# Patient Record
Sex: MALE | Race: WHITE
[De-identification: names, ages, dates, MRNs, and addresses within clinical notes are randomized per-mention and may not be internally consistent; named-entity substitution may affect disease eponyms.]

---

## 2019-04-11 ENCOUNTER — HOSPITAL ENCOUNTER (EMERGENCY)
Dept: HOSPITAL 10 - FTE | Age: 54
Discharge: HOME | End: 2019-04-11
Payer: SELF-PAY

## 2019-04-11 ENCOUNTER — HOSPITAL ENCOUNTER (EMERGENCY)
Dept: HOSPITAL 91 - FTE | Age: 54
Discharge: HOME | End: 2019-04-11
Payer: SELF-PAY

## 2019-04-11 VITALS — SYSTOLIC BLOOD PRESSURE: 119 MMHG | DIASTOLIC BLOOD PRESSURE: 74 MMHG | HEART RATE: 105 BPM | RESPIRATION RATE: 20 BRPM

## 2019-04-11 VITALS
BODY MASS INDEX: 25.53 KG/M2 | WEIGHT: 172.4 LBS | HEIGHT: 69 IN | WEIGHT: 172.4 LBS | HEIGHT: 69 IN | BODY MASS INDEX: 25.53 KG/M2

## 2019-04-11 DIAGNOSIS — L02.414: Primary | ICD-10-CM

## 2019-04-11 DIAGNOSIS — Z87.891: ICD-10-CM

## 2019-04-11 DIAGNOSIS — F19.90: ICD-10-CM

## 2019-04-11 PROCEDURE — 99283 EMERGENCY DEPT VISIT LOW MDM: CPT

## 2019-04-11 RX ADMIN — CEPHALEXIN 1 MG: 500 CAPSULE ORAL at 08:06

## 2019-04-11 RX ADMIN — SULFAMETHOXAZOLE AND TRIMETHOPRIM 1 TAB: 800; 160 TABLET ORAL at 08:06

## 2019-04-11 RX ADMIN — IBUPROFEN 1 MG: 800 TABLET, FILM COATED ORAL at 08:06

## 2019-04-11 NOTE — ERD
ER Documentation


Chief Complaint


Chief Complaint





right wrist abscess/redness/swelling x 2 days. states from iv drug use





HPI


53-year-old male presents for an abscess to the right arm.  Patient admits to 


IVDU.  He states he has had some reoccurring abscesses for the past "couple 


months ".  He states that the abscesses occur over injection sites.  Patient 


states the abscess has been draining over the past couple days and he is 


experiencing pain and redness over the site.  He denies any fevers, chills, 


nausea, vomiting, chest pain, shortness of breath, cough or other systemic 


symptoms.  





Patient denies any pertinent past medical history including hypertension, 


diabetes, or HIV infection.  He states he has previously had 2 surgeries to the 


right hand.  Not currently taking any medications and has no allergies to 


medications.





He admits to occasional alcohol use, however, he denies drug use other than 


intravenous methamphetamines.





ROS


All systems reviewed and are negative except as per history of present illness.





Medications


Home Meds


Discontinued Scripts


Sulfamethoxazole/Trimethoprim* (Bactrim Ds* Tablet) 1 Each Tablet, 1 TAB PO BID 


for 10 Days, TAB


   Prov:TOBY PRICE PA-C         4/11/19


Cephalexin* (Keflex*) 500 Mg Capsule, 500 MG PO QID for 10 Days, CAP


   Prov:TOBY PRICE PA-C         4/11/19





Allergies


Allergies:  


Coded Allergies:  


     No Known Drug Allergies (Verified  Allergy, Unknown, 4/18/19)





PMhx/Soc


Medical and Surgical Hx:  pt denies Medical Hx, pt denies Surgical Hx


Hx Alcohol Use:  Yes


Hx Substance Use:  Yes (iv drug user, crystal meth)


Hx Tobacco Use:  Yes


Smoking Status:  Former smoker





FmHx


Family History:  No diabetes, No coronary disease





Physical Exam


Vitals


Vital Signs


  Date      Temp  Pulse  Resp  B/P (MAP)   Pulse Ox  O2          O2 Flow    FiO2


Time                                                 Delivery    Rate


    4/9/19  97.7     83    20      112/63        97


     15:02                           (79)





Physical Exam


Const:   Moderate distress


Head:   Atraumatic 


Eyes:    Normal Conjunctiva


ENT:    Normal External Ears, Nose and Mouth.


Neck:               Full range of motion. No meningismus.


Resp:   Clear to auscultation bilaterally


Cardio:   Regular rate and rhythm, no murmurs


Abd:    Soft, non tender, non distended. Normal bowel sounds


Skin:   Multiple areas of scar tissue over the left forearm.  Approximately 


2.5cm area of poorly healing granulated tissue over the left forearm, however 


nontender and without purulence.  three centimeter area over the volar aspect of


the right forearm currently draining pus.  Noticeable erythema and soft tissue 


swelling over the right volar aspect of the forearm with tenderness to palpation


Back:   No midline or flank tenderness


Ext:    No cyanosis, or edema


Neur:   Awake and alert


Psych:    Normal Mood and Affect


Results 24 hrs





Current Medications


 Medications
   Dose
          Sig/Brian
       Start Time
   Status  Last


 (Trade)       Ordered        Route
 PRN     Stop Time              Admin
Dose


                              Reason                                Admin


                1 tab          ONCE  ONCE
    4/11/19       DC           4/11/19


Trimethoprim/                 PO
            08:00
                       08:06




                                            4/11/19 08:01


Sulfamethoxaz


ole



(Bactrim


(Ds))


 Cephalexin
    500 mg         ONCE  ONCE
    4/11/19       DC           4/11/19


(Keflex)                      PO
            08:00
                       08:06



                                             4/11/19 08:01


 Ibuprofen
     800 mg         ONCE  ONCE
    4/11/19       DC           4/11/19


(Motrin)                      PO
            08:00
                       08:06



                                             4/11/19 08:01








Procedures/MDM


53-year-old male patient with a past medical history of IV drug use presents to 


the ED complaining of an abscess on the forearm that started 2 days ago.  


Patient reports that he used it, 4 days ago.  Patient was given Bactrim, Keflex 


here in the ED.  Given ibuprofen here in the ED with improvement of his pain.





Spontaneous copious purulent discharge continuously drained from the abscess.  


Patient just was discussed with my supervising physician, Dr. Norwood who 


agreed with the outpatient management.  This likely secondary to IV drug use, 


meth. Strict instructions for wound check in 2 days. Low suspicion for an


aphylaxis, scabies, SJS/TEN, TSS, Lyme's Disease, syphilis, RMSF, shingles, 


disseminated gonorrhea chlamydia, DIC, TTP, ITP, erythema multiforme, sepsis, 


cellulitis, necrotizing fasciitis, gangrene, meningococcemia, allergic contact 


dermatitis, urticaria, eczema, tinea infection, or other emergent conditions.











Keflex and Bactrim was prescribed to patient.  Instructed patient to return to 


the ED sooner for any worsening symptoms.  Follow up with primary care physician


or return to the ED in 2 days for a wound check.  Patient's questions were 


answered.  Patient understood and agreed with discharge plan.





Departure


Diagnosis:  


   Primary Impression:  


   Abscess


   Additional Impression:  


   IV drug user


Condition:  Stable


Patient Instructions:  Understanding Methamphetamine Abuse and Addiction, 


Abscess, Antiobiotic Treatment Only


Referrals:  


[facility]


YOU HAVE RECEIVED A MEDICAL SCREENING EXAM AND THE RESULTS INDICATE THAT YOU DO 


NOT HAVE A CONDITION THAT REQUIRES URGENT TREATMENT IN THE EMERGENCY DEPARTMENT.





FURTHER EVALUATION AND TREATMENT OF YOUR CONDITION CAN WAIT UNTIL YOU ARE SEEN 


IN YOUR DOCTORS OFFICE WITHIN THE NEXT 1-2 DAYS. IT IS YOUR RESPONSIBILITY TO 


MAKE AN APPOINTMENT FOR FOLOW-UP CARE.





IF YOU HAVE A PRIMARY DOCTOR


--you should call your primary doctor and schedule an appointment





IF YOU DO NOT HAVE A PRIMARY DOCTOR YOU CAN CALL OUR PHYSICIAN REFERRAL HOTLINE 


AT


 [phone] 





IF YOU CAN NOT AFFORD TO SEE A PHYSICIAN YOU CAN CHOSE FROM THE FOLLOWING 


[facility] [phone] [address] [facility]. [facility] [phone] [address] [facility]. [facility] [phone] [address] [facility] [phone] [facility] [phone] [address] [facility]. [facility] [phone] [address] [facility]. [facility]


YOU HAVE RECEIVED A MEDICAL SCREENING EXAM AND THE RESULTS INDICATE THAT YOU DO 


NOT HAVE A CONDITION THAT REQUIRES URGENT TREATMENT IN THE EMERGENCY DEPARTMENT.





FURTHER EVALUATION AND TREATMENT OF YOUR CONDITION CAN WAIT UNTIL YOU ARE SEEN 


IN YOUR DOCTORS OFFICE WITHIN THE NEXT 1-2 DAYS. IT IS YOUR RESPONSIBILITY TO 


MAKE AN APPOINTMENT FOR FOLOW-UP CARE.





IF YOU HAVE A PRIMARY DOCTOR


--you should call your primary doctor and schedule and appointment





IF YOU DO NOT HAVE A PRIMARY DOCTOR YOU CAN CALL OUR PHYSICIAN REFERRAL HOTLINE 


AT [phone].





IF YOU CAN NOT AFFORD TO SEE A PHYSICIAN YOU CAN CHOSE FROM THE FOLLOWING [facility]


INSTITUTIONS:





[facility]


[address] [place] [address]





[facility]


[address] [place] [address]





[facility]


[address] [place] [address]





[facility] URGENT [facility]ARE[facility]SPECIALT[facility]








[facility]





Additional Instructions:  


Call your primary care doctor TOMORROW for an appointment during the next 2-3 


days.See the doctor sooner or return here if your condition worsens before your 


appointment time.











TOBY PRICE PA-C              Apr 11, 2019 08:15

## 2019-04-18 ENCOUNTER — HOSPITAL ENCOUNTER (INPATIENT)
Dept: HOSPITAL 91 - 6WM | Age: 54
LOS: 2 days | Discharge: HOME | DRG: 871 | End: 2019-04-20
Payer: MEDICAID

## 2019-04-18 ENCOUNTER — HOSPITAL ENCOUNTER (INPATIENT)
Dept: HOSPITAL 10 - E/R | Age: 54
LOS: 2 days | Discharge: HOME | DRG: 871 | End: 2019-04-20
Attending: INTERNAL MEDICINE | Admitting: INTERNAL MEDICINE
Payer: MEDICAID

## 2019-04-18 VITALS — HEART RATE: 110 BPM

## 2019-04-18 VITALS
BODY MASS INDEX: 26.89 KG/M2 | HEIGHT: 67 IN | BODY MASS INDEX: 26.89 KG/M2 | HEIGHT: 67 IN | WEIGHT: 171.3 LBS | WEIGHT: 171.3 LBS

## 2019-04-18 VITALS — HEART RATE: 110 BPM | DIASTOLIC BLOOD PRESSURE: 72 MMHG | SYSTOLIC BLOOD PRESSURE: 101 MMHG | RESPIRATION RATE: 18 BRPM

## 2019-04-18 DIAGNOSIS — Z72.0: ICD-10-CM

## 2019-04-18 DIAGNOSIS — R06.6: ICD-10-CM

## 2019-04-18 DIAGNOSIS — A41.9: Primary | ICD-10-CM

## 2019-04-18 DIAGNOSIS — E87.1: ICD-10-CM

## 2019-04-18 DIAGNOSIS — Z72.89: ICD-10-CM

## 2019-04-18 DIAGNOSIS — F15.10: ICD-10-CM

## 2019-04-18 DIAGNOSIS — R21: ICD-10-CM

## 2019-04-18 DIAGNOSIS — N17.9: ICD-10-CM

## 2019-04-18 DIAGNOSIS — R65.21: ICD-10-CM

## 2019-04-18 DIAGNOSIS — K21.9: ICD-10-CM

## 2019-04-18 LAB
ADD MAN DIFF?: NO
ADD UMIC: NO
ALANINE AMINOTRANSFERASE: 20 IU/L (ref 13–69)
ALBUMIN/GLOBULIN RATIO: 1.02
ALBUMIN: 3.7 G/DL (ref 3.3–4.9)
ALKALINE PHOSPHATASE: 120 IU/L (ref 42–121)
AMPHETAMINE/METHAMPHETAMINE: POSITIVE
ANION GAP: 18 (ref 5–13)
ASPARTATE AMINO TRANSFERASE: 57 IU/L (ref 15–46)
BASOPHIL #: 0.1 10^3/UL (ref 0–0.1)
BASOPHILS %: 0.6 % (ref 0–2)
BILIRUBIN,DIRECT: 0 MG/DL (ref 0–0.2)
BILIRUBIN,TOTAL: 1.1 MG/DL (ref 0.2–1.3)
BLOOD UREA NITROGEN: 22 MG/DL (ref 7–20)
CALCIUM: 10.4 MG/DL (ref 8.4–10.2)
CARBON DIOXIDE: 13 MMOL/L (ref 21–31)
CHLORIDE: 100 MMOL/L (ref 97–110)
CREATININE: 2.13 MG/DL (ref 0.61–1.24)
EOSINOPHILS #: 0 10^3/UL (ref 0–0.5)
EOSINOPHILS %: 0 % (ref 0–7)
GLOBULIN: 3.6 G/DL (ref 1.3–3.2)
GLUCOSE: 268 MG/DL (ref 70–220)
HEMATOCRIT: 56.3 % (ref 42–52)
HEMOGLOBIN A1C: 6 % (ref 0–5.9)
HEMOGLOBIN: 19.1 G/DL (ref 14–18)
IMMATURE GRANS #M: 0.17 10^3/UL (ref 0–0.03)
IMMATURE GRANS % (M): 0.8 % (ref 0–0.43)
INR: 0.91
LACTIC ACID: 2.3 MMOL/L (ref 0.5–2)
LYMPHOCYTES #: 3.4 10^3/UL (ref 0.8–2.9)
LYMPHOCYTES %: 15.8 % (ref 15–51)
MEAN CORPUSCULAR HEMOGLOBIN: 29.9 PG (ref 29–33)
MEAN CORPUSCULAR HGB CONC: 33.9 G/DL (ref 32–37)
MEAN CORPUSCULAR VOLUME: 88.1 FL (ref 82–101)
MEAN PLATELET VOLUME: 10.5 FL (ref 7.4–10.4)
MONOCYTE #: 0.8 10^3/UL (ref 0.3–0.9)
MONOCYTES %: 3.8 % (ref 0–11)
NEUTROPHIL #: 16.8 10^3/UL (ref 1.6–7.5)
NEUTROPHILS %: 79 % (ref 39–77)
NUCLEATED RED BLOOD CELLS #: 0 10^3/UL (ref 0–0)
NUCLEATED RED BLOOD CELLS%: 0 /100WBC (ref 0–0)
PARTIAL THROMBOPLASTIN TIME: 27 SEC (ref 23–35)
PLATELET COUNT: 507 10^3/UL (ref 140–415)
POTASSIUM: 5.2 MMOL/L (ref 3.5–5.1)
PROTIME: 12.4 SEC (ref 11.9–14.9)
PT RATIO: 1
RED BLOOD COUNT: 6.39 10^6/UL (ref 4.7–6.1)
RED CELL DISTRIBUTION WIDTH: 12.5 % (ref 11.5–14.5)
SODIUM: 131 MMOL/L (ref 135–144)
TOTAL PROTEIN: 7.3 G/DL (ref 6.1–8.1)
TROPONIN-I: < 0.012 NG/ML (ref 0–0.12)
UR ASCORBIC ACID: NEGATIVE MG/DL
UR BILIRUBIN (DIP): NEGATIVE MG/DL
UR BLOOD (DIP): NEGATIVE MG/DL
UR CLARITY: (no result)
UR COLOR: YELLOW
UR GLUCOSE (DIP): NEGATIVE MG/DL
UR KETONES (DIP): NEGATIVE MG/DL
UR LEUKOCYTE ESTERASE (DIP): NEGATIVE LEU/UL
UR MUCUS: (no result) /HPF
UR NITRITE (DIP): NEGATIVE MG/DL
UR PH (DIP): 5 (ref 5–9)
UR RBC: 4 /HPF (ref 0–5)
UR SPECIFIC GRAVITY (DIP): 1.02 (ref 1–1.03)
UR TOTAL PROTEIN (DIP): NEGATIVE MG/DL
UR UROBILINOGEN (DIP): NEGATIVE MG/DL
UR WBC: 7 /HPF (ref 0–5)
WHITE BLOOD COUNT: 21.2 10^3/UL (ref 4.8–10.8)

## 2019-04-18 PROCEDURE — 85025 COMPLETE CBC W/AUTO DIFF WBC: CPT

## 2019-04-18 PROCEDURE — 81001 URINALYSIS AUTO W/SCOPE: CPT

## 2019-04-18 PROCEDURE — 96365 THER/PROPH/DIAG IV INF INIT: CPT

## 2019-04-18 PROCEDURE — 80053 COMPREHEN METABOLIC PANEL: CPT

## 2019-04-18 PROCEDURE — 93306 TTE W/DOPPLER COMPLETE: CPT

## 2019-04-18 PROCEDURE — 87040 BLOOD CULTURE FOR BACTERIA: CPT

## 2019-04-18 PROCEDURE — 80307 DRUG TEST PRSMV CHEM ANLYZR: CPT

## 2019-04-18 PROCEDURE — 93005 ELECTROCARDIOGRAM TRACING: CPT

## 2019-04-18 PROCEDURE — 87045 FECES CULTURE AEROBIC BACT: CPT

## 2019-04-18 PROCEDURE — 81003 URINALYSIS AUTO W/O SCOPE: CPT

## 2019-04-18 PROCEDURE — 85730 THROMBOPLASTIN TIME PARTIAL: CPT

## 2019-04-18 PROCEDURE — 96375 TX/PRO/DX INJ NEW DRUG ADDON: CPT

## 2019-04-18 PROCEDURE — 99285 EMERGENCY DEPT VISIT HI MDM: CPT

## 2019-04-18 PROCEDURE — 97161 PT EVAL LOW COMPLEX 20 MIN: CPT

## 2019-04-18 PROCEDURE — 87086 URINE CULTURE/COLONY COUNT: CPT

## 2019-04-18 PROCEDURE — 97166 OT EVAL MOD COMPLEX 45 MIN: CPT

## 2019-04-18 PROCEDURE — 71045 X-RAY EXAM CHEST 1 VIEW: CPT

## 2019-04-18 PROCEDURE — 96367 TX/PROPH/DG ADDL SEQ IV INF: CPT

## 2019-04-18 PROCEDURE — 85610 PROTHROMBIN TIME: CPT

## 2019-04-18 PROCEDURE — 84484 ASSAY OF TROPONIN QUANT: CPT

## 2019-04-18 PROCEDURE — 83735 ASSAY OF MAGNESIUM: CPT

## 2019-04-18 PROCEDURE — 87081 CULTURE SCREEN ONLY: CPT

## 2019-04-18 PROCEDURE — 83036 HEMOGLOBIN GLYCOSYLATED A1C: CPT

## 2019-04-18 PROCEDURE — 36415 COLL VENOUS BLD VENIPUNCTURE: CPT

## 2019-04-18 PROCEDURE — 83605 ASSAY OF LACTIC ACID: CPT

## 2019-04-18 RX ADMIN — LORAZEPAM 1 MG: 2 INJECTION, SOLUTION INTRAMUSCULAR; INTRAVENOUS at 17:02

## 2019-04-18 RX ADMIN — THIAMINE HYDROCHLORIDE 1 MLS/HR: 100 INJECTION, SOLUTION INTRAMUSCULAR; INTRAVENOUS at 19:59

## 2019-04-18 RX ADMIN — DICYCLOMINE HYDROCHLORIDE 1 MG: 10 CAPSULE ORAL at 17:02

## 2019-04-18 RX ADMIN — PANTOPRAZOLE SODIUM 1 MG: 40 TABLET, DELAYED RELEASE ORAL at 21:52

## 2019-04-18 RX ADMIN — ONDANSETRON HYDROCHLORIDE 1 MG: 2 INJECTION, SOLUTION INTRAMUSCULAR; INTRAVENOUS at 16:31

## 2019-04-18 RX ADMIN — PIPERACILLIN SODIUM AND TAZOBACTAM SODIUM SCH MLS/HR: 3; .375 INJECTION, POWDER, LYOPHILIZED, FOR SOLUTION INTRAVENOUS at 21:53

## 2019-04-18 RX ADMIN — LIDOCAINE HYDROCHLORIDE 1 MLS/HR: 10 INJECTION, SOLUTION EPIDURAL; INFILTRATION; INTRACAUDAL; PERINEURAL at 23:31

## 2019-04-18 RX ADMIN — THIAMINE HYDROCHLORIDE 1 ML: 100 INJECTION, SOLUTION INTRAMUSCULAR; INTRAVENOUS at 15:00

## 2019-04-18 RX ADMIN — DIPHENHYDRAMINE HYDROCHLORIDE 1 MG: 50 INJECTION, SOLUTION INTRAMUSCULAR; INTRAVENOUS at 20:09

## 2019-04-18 RX ADMIN — DIPHENHYDRAMINE HYDROCHLORIDE 1 MG: 50 INJECTION, SOLUTION INTRAMUSCULAR; INTRAVENOUS at 16:20

## 2019-04-18 RX ADMIN — CHLORDIAZEPOXIDE HYDROCHLORIDE SCH MG: 25 CAPSULE ORAL at 20:08

## 2019-04-18 RX ADMIN — ONDANSETRON HYDROCHLORIDE 1 MG: 2 INJECTION, SOLUTION INTRAMUSCULAR; INTRAVENOUS at 20:08

## 2019-04-18 RX ADMIN — LORAZEPAM PRN MG: 2 INJECTION, SOLUTION INTRAMUSCULAR; INTRAVENOUS at 17:02

## 2019-04-18 RX ADMIN — FOLIC ACID SCH MLS/HR: 5 INJECTION, SOLUTION INTRAMUSCULAR; INTRAVENOUS; SUBCUTANEOUS at 19:59

## 2019-04-18 RX ADMIN — HEPARIN SODIUM 1 UNIT: 5000 INJECTION, SOLUTION INTRAVENOUS; SUBCUTANEOUS at 20:51

## 2019-04-18 RX ADMIN — HEPARIN SODIUM SCH UNIT: 5000 INJECTION, SOLUTION INTRAVENOUS; SUBCUTANEOUS at 20:51

## 2019-04-18 RX ADMIN — PIPERACILLIN SODIUM AND TAZOBACTAM SODIUM 1 MLS/HR: 3; .375 INJECTION, POWDER, LYOPHILIZED, FOR SOLUTION INTRAVENOUS at 21:53

## 2019-04-18 RX ADMIN — PIPERACILLIN SODIUM AND TAZOBACTAM SODIUM 1 MLS/HR: 3; .375 INJECTION, POWDER, LYOPHILIZED, FOR SOLUTION INTRAVENOUS at 15:01

## 2019-04-18 RX ADMIN — CHLORDIAZEPOXIDE HYDROCHLORIDE 1 MG: 25 CAPSULE ORAL at 20:08

## 2019-04-18 RX ADMIN — ALUMINUM HYDROXIDE, MAGNESIUM HYDROXIDE, AND SIMETHICONE 1 ML: 200; 200; 20 SUSPENSION ORAL at 21:52

## 2019-04-18 RX ADMIN — VANCOMYCIN HYDROCHLORIDE 1 MLS/HR: 1 INJECTION, POWDER, LYOPHILIZED, FOR SOLUTION INTRAVENOUS at 15:49

## 2019-04-18 NOTE — ERD
ER Documentation


Chief Complaint


Chief Complaint





SOB, BODY RASH, NECK WEAKNESS





HPI


Patient is a 53-year-old male with a history of IV drug abuse who presents with 


"full body pain".  The patient has had no fevers.  He has had symptoms over the 


past 2 days however.  He was seen on April 11 and was diagnosed with abscess and


was discharged with Bactrim and Keflex but is getting worse.  He does not 


currently have a primary doctor.





ROS


All systems reviewed and are negative except as per history of present illness.





Medications


Home Meds


Discontinued Scripts


Sulfamethoxazole/Trimethoprim* (Bactrim Ds* Tablet) 1 Each Tablet, 1 TAB PO BID 


for 10 Days, TAB


   Prov:TOBY PRICE PA-C         4/11/19


Cephalexin* (Keflex*) 500 Mg Capsule, 500 MG PO QID for 10 Days, CAP


   Prov:TOBY PRICE PA-C         4/11/19





Allergies


Allergies:  


Coded Allergies:  


     No Known Drug Allergies (Verified  Allergy, Unknown, 4/18/19)





PMhx/Soc


History of Surgery:  No


Anesthesia Reaction:  No


Hx Neurological Disorder:  No


Hx Respiratory Disorders:  No


Hx Cardiac Disorders:  No


Hx Psychiatric Problems:  No


Hx Miscellaneous Medical Probl:  Yes (i/v drug use )


Hx Alcohol Use:  Yes


Hx Substance Use:  Yes (iv drug user, crystal meth)


Hx Tobacco Use:  Yes


Smoking Status:  Current every day smoker





FmHx


Family History:  No diabetes





Physical Exam


Vitals





Vital Signs


  Date      Temp  Pulse  Resp  B/P (MAP)   Pulse Ox  O2          O2 Flow    FiO2


Time                                                 Delivery    Rate


   4/18/19          112    20      132/87       100  Nasal             2.0


     17:30                          (102)            Cannula


   4/18/19          107    20      120/85       100  Nasal             2.0


     17:00                           (97)            Cannula


   4/18/19          112    18      120/96       100  Nasal             2.0


     16:30                          (104)            Cannula


   4/18/19          110    18      104/78       100  Nasal             2.0


     15:48                           (87)            Cannula


   4/18/19  97.7    129    24      106/57        97


     14:38                           (73)





Physical Exam


Const:   Moderate distress


Head:   Atraumatic 


Eyes:    Normal Conjunctiva


ENT:    Normal External Ears, Nose and Mouth.


Neck:               Full range of motion. No meningismus.


Resp:   Clear to auscultation bilaterally


Cardio:   Tachycardic rate


Abd:    Soft, non tender, non distended. Normal bowel sounds


Skin:   Multiple skin ulcers with track marks from IV drug use


Back:   No midline or flank tenderness


Ext:    No cyanosis, or edema


Neur:   Awake and alert


Psych:    Normal Mood and Affect


Result Diagram:  


4/18/19 1459                                                                    


           4/18/19 1459





Results 24 hrs





Laboratory Tests


Test
              4/18/19
14:54   4/18/19
14:59    4/18/19
15:40  4/18/19
17:08


POC Venous           5.6 mmol/L                                      2.9 mmol/L


Lactate


White Blood Count                  21.2 10^3/ul


Red Blood Count                    6.39 10^6/ul


Hemoglobin                            19.1 g/dl


Hematocrit                               56.3 %


Mean Corpuscular                        88.1 fl


Volume


Mean Corpuscular                        29.9 pg


Hemoglobin


Mean Corpuscular   
                 33.9 g/dl 
  
                



Hemoglobin
Concen


t


Red Cell                                 12.5 %


Distribution


Width


Platelet Count                      507 10^3/UL


Mean Platelet                           10.5 fl


Volume


Immature                                0.800 %


Granulocytes %


Neutrophils %                            79.0 %


Lymphocytes %                            15.8 %


Monocytes %                               3.8 %


Eosinophils %                             0.0 %


Basophils %                               0.6 %


Nucleated Red                       0.0 /100WBC


Blood Cells %


Immature                          0.170 10^3/ul


Granulocytes #


Neutrophils #                      16.8 10^3/ul


Lymphocytes #                       3.4 10^3/ul


Monocytes #                         0.8 10^3/ul


Eosinophils #                       0.0 10^3/ul


Basophils #                         0.1 10^3/ul


Nucleated Red                       0.0 10^3/ul


Blood Cells #


Prothrombin Time                       12.4 Sec


Prothrombin Time                            1.0


Ratio


INR International  
                      0.91 
  
                



Normalized
Ratio


Activated          
                  27.0 Sec 
  
                



Partial
Thrombopl


ast Time


Sodium Level                         131 mmol/L


Potassium Level                      5.2 mmol/L


Chloride Level                       100 mmol/L


Carbon Dioxide                        13 mmol/L


Level


Anion Gap                                    18


Blood Urea                             22 mg/dl


Nitrogen


Creatinine                           2.13 mg/dl


Est Glomerular     
                 33 mL/min 
  
                



Filtrat


Rate
mL/min


Glucose Level                         268 mg/dl


Hemoglobin A1c                            6.0 %


Calcium Level                        10.4 mg/dl


Total Bilirubin                       1.1 mg/dl


Direct Bilirubin                     0.00 mg/dl


Indirect                              1.1 mg/dl


Bilirubin


Aspartate Amino    
                   57 IU/L 
  
                



Transf
(AST/SGOT)


Alanine            
                   20 IU/L 
  
                



Aminotransferase



(ALT/SGPT)


Alkaline                               120 IU/L


Phosphatase


Troponin I                        < 0.012 ng/ml


Total Protein                          7.3 g/dl


Albumin                                3.7 g/dl


Globulin                              3.60 g/dl


Albumin/Globulin                           1.02


Ratio


Urine Color                                       YELLOW


Urine Clarity
     
              
               SLIGHTLY
CLOUDY  



Urine pH                                                     5.0


Urine Specific                                             1.025


Gravity


Urine Ketones                                     NEGATIVE mg/dL


Urine Nitrite                                     NEGATIVE mg/dL


Urine Bilirubin                                   NEGATIVE mg/dL


Urine                                             NEGATIVE mg/dL


Urobilinogen


Urine Leukocyte    
              
               NEGATIVE
Sung/ul  



Esterase



Urine Microscopic                                         4 /HPF


RBC


Urine Microscopic                                         7 /HPF


WBC


Urine Mucus                                       FEW /HPF


Urine Hemoglobin                                  NEGATIVE mg/dL


Urine Glucose                                     NEGATIVE mg/dL


Urine Total                                       NEGATIVE mg/dl


Protein





Current Medications


 Medications
   Dose
          Sig/Brian
       Start Time
   Status  Last


 (Trade)       Ordered        Route
 PRN     Stop Time              Admin
Dose


                              Reason                                Admin


 Sodium         2,320 ml       BOLUS OVER 2   4/18/19       DC           4/18/19


Chloride
                     HOURS STAT
    14:45
                       15:00



(NS)                          IV*
           4/18/19 14:47


 Vancomycin     250 ml @ 
     ONCE  ONCE
    4/18/19       DC           4/18/19


HCl            125 mls/hr     IVPB
          15:00
                       15:49



                                             4/18/19 16:59


 Piperacillin   100 ml @ 
     ONCE  ONCE
    4/18/19       DC           4/18/19


Sod/
          200 mls/hr     IVPB
          15:00
                       15:01



Tazobactam                                   4/18/19 15:29


Sod


                25 mg          ONCE  ONCE
    4/18/19       DC           4/18/19


Diphenhydrami                 IV
            16:30
                       16:20



ne
 HCl
                                     4/18/19 16:31


(Benadryl)


 Ondansetron    4 mg           ER BRIDGE      4/18/19                



HCl
  (Zofran                 PRN
 IV
       16:30



Inj)                          NAUSEA/VOMITI  4/19/19 16:29


                              NG


                650 mg         ER BRIDGE      4/18/19                



Acetaminophen                 PRN
 PO
       16:30




  (Tylenol                   .MILD PAIN     4/19/19 16:29


Tab)                          1-3 OR TEMP


 Ondansetron    4 mg           ONCE  STAT
    4/18/19       DC           4/18/19


HCl
  (Zofran                 IV
            16:26
                       16:31



Inj)                                         4/18/19 16:27


 Sodium         1,000 ml @ 
   Q10H
 IV
      4/18/19                



Chloride       100 mls/hr                    16:22



 IV Flush
      3 ml           PER            4/18/19                



(NS 3 ml)                     PROTOCOL
 IV
  16:30



 Lorazepam
     0.5 mg         Q6H  PRN
      4/18/19                



(Ativan)                      IV
 .ANXIETY   16:30



 Ondansetron    4 mg           Q6H  PRN
      4/18/19                



HCl
  (Zofran                 IV
            16:30



Inj)                          NAUSEA/VOMITI


                              NG


                650 mg         Q6H  PRN
      4/18/19                



Acetaminophen                 PO
 .PAIN 1-3  16:30




  (Tylenol                   OR TEMP


Tab)


                1 tab          Q6H  PRN
      4/18/19                



Acetaminophen                 PO
 .PAIN 4-6  16:30



/



Hydrocodone


Bitart



(Norco


(5/325))


                2 tab          Q6H  PRN
      4/18/19                



Acetaminophen                 PO
 .PAIN      16:30



/
                            7-10


Hydrocodone


Bitart



(Norco


(5/325))


 Morphine       2 mg           Q4H  PRN
      4/18/19                



Sulfate
                      IV
 .PAIN      16:30



(morphine)                    7-10


 Docusate       100 mg         Q12H  PRN
     4/18/19       DC       



Sodium
                       PO
            16:30



(Colace)                      .CONSTIPATION  4/18/19 17:01


 Magnesium
     30 ml          DAILY  PRN
    4/18/19       DC       



Hydroxide
                    PO
            16:30



(Milk Of Mag)                 .CONSTIPATION  4/18/19 17:01


                40 mg          DAILY@06
      4/19/19                



Pantoprazole
                 PO
            06:00



 (Protonix


Tab)


 Heparin        5,000 unit     Q12
 SC
       4/18/19                



Sodium
                                      21:00



(Porcine)



(Heparin


(5000



Units/1ml))


 Vancomycin     1 ea           PER            4/18/19                



HCl
  (Vanco                  PROTOCOL
 XX
  16:30



Iv Per



Pharmacy)


 Piperacillin   100 ml @ 
     Q8
 IVPB
      4/18/19                



Sod/
          200 mls/hr                    22:00



Tazobactam


Sod


                25 mg          Q6H  PRN
      4/18/19                



Diphenhydrami                 IV
 itching    17:00



ne
 HCl



(Benadryl)


 Dicyclomine    10 mg          QID  PRN
      4/18/19 4/18/19


HCl
                          PO
 abdominal  17:00
                       17:02



(Bentyl)                      cramps


 Clonidine
     0.1 mg         Q4H  PRN
      4/18/19                



(Catapres)                    PO
 SBP >170   17:00



 Lorazepam
     1 mg           Q4  PRN
 IV
   4/18/19 4/18/19


(Ativan)                      agitation,     17:00
                       17:02



                              withdrawal


                25 mg          TID
 PO
       4/18/19                



Chlordiazepox                                21:00



umberto



(Librium)


 Docusate       100 mg         Q12H
 PO
      4/19/19                



Sodium
                                      04:30



(Colace)


 Magnesium
     30 ml          DAILY
 PO
     4/19/19                



Hydroxide
                                   09:00



(Milk Of Mag)


 Vancomycin     250 ml @ 
     Q24H
 IVPB
    4/19/19                



HCl            125 mls/hr                    08:00









Procedures/MDM


EKG read by me: 


Rate/Rhythm: Tachycardia


Intervals:    Normal


Impression:    Sinus tachycardia without ischemia





Chest x-ray negative for pneumonia per radiology.





Sepsis Documentation:





Patient's infectious symptoms have not stabilized and the patient is at risk of 


rapid decompensation.  The patient will be admitted for careful hydration, 


antibiotic therapy, and infectious source control.





SEVERE SEPSIS CRITERIA:





Infectious source: Skin infection





End organ damage indicated by: 





Lactate greater than 2





SEPSIS MANAGEMENT





Time of recognition of sepsis: 1454.


Time of recognition of severe sepsis: 1454.


Time of recognition of septic shock: 1454.





3 HOUR BUNDLE





Blood cultures x 2 before broad-spectrum antibiotics: Yes





30 ml/kg NS bolus completed





Initial lactate 5.6





Repeat lactate 2.9





SEPTIC SHOCK ASSESSMENT:





Yes lactic acid > 4.0 





No persistent hypotension (SBP < 90 or 40 mmHg drop, MAP < 65) despite 30 mL/kg 


IV fluid bolus 





VOLUME REASSESSMENT FOR SEPTIC SHOCK:





Reevaluation Time: 1700





Temp 97.7, /85, , RR 20, Pox 100%


Heart regular rate & rhythm


Lungs no crackles


Skin warm & dry


Cap Refill less than 2 seconds


Peripheral pulses radially present





PERSISTENT HYPOTENSION TREATMENT:





Comfort care no


Central line not Required


Vasopressor started not required





I considered further perfusion assessment with CVP measurement, SCVO2, bedside 


ultrasound volume assessment, passive leg raise, trial of further fluid bolus.  


And proceeded with 30 ml/kg fluid bolus of NSS, broad spectrum antibiotics, and 


admission.  The patient will be admitted to the panel team to a telemetry bed.





CRITICAL CARE





Critical care time 35 minutes





Emergent fluid management while maintaining close respiratory support.  


Provision of immediate and broad-spectrum antibiotic therapy.  Simultaneous 


assessment for possible sources in order to direct targeted therapy.  


Consideration for invasive and chemical support to prevent cardiopulmonary 


collapse.  Critical care time is independent of procedures performed.





Departure


Diagnosis:  


   Primary Impression:  


   Septic shock


   Additional Impressions:  


   Acute renal failure


   Acute renal failure type:  unspecified  Qualified Codes:  N17.9 - Acute kidn


   ey failure, unspecified


   Acidosis


   Leukocytosis


   Leukocytosis type:  unspecified  Qualified Codes:  D72.829 - Elevated white 


   blood cell count, unspecified


Condition:  Serious











ANDREW MAN MD                Apr 18, 2019 17:52

## 2019-04-18 NOTE — HP
Date/Time of Note


Date/Time of Note


DATE: 4/18/19 


TIME: 16:53





Assessment/Plan


VTE Prophylaxis


SCD applied (from Nsg):  Yes


Pharmacological prophylaxis:  NA/contraindicated


Pharm contraindication:  renal impairment





Lines/Catheters


IV Catheter Type (from Nrsg):  Mid Line





Assessment/Plan


Assessment/Plan


1. Septic shock, unknown source


- Given patient has hx IVDU will check pan cultures and ECHO to assess for 


endocarditis


- WBC elevated with LA 5.  Will trend and continue on IVF and broad spectrum 


antibiotics


- ID consulted for antibiotic recommendations


- wounds on limbs do not appear infectious but high risk of bacteremia given 


drug history





2.  Acute renal failure


- most likely prerenal and will continue IVF


- If no improvement, will consult nephrology for assistance with workup


- given patient has elevated hgb and ca level, most likely dehydration as 


etiology





3.  Hypovolemic hyponatremia


- no neurological sx noted


- IVF on board





4.  Generalized rash


-  unknown etiology


- Benadryl PRN for relief





5.  Shortness of breath


- most likely from tachycardia and sepsis but will check ECHO to rule out 


cardiac etiology





6.  hx IVDU


- patient appears to be withdrawing


- will check Utox


- supportive care





7.  tobacco abuse


- cessation counseling offered


- refused nicotine patch





8.  ETOH use


- will start on Librium since patient vague about how much he drinks but did 


admit to drinking daily


- monitor for DTs





9.  Diet


- cardiac





10.  DVT ppx


- SCD





11.  Disposition


- Admit to telemetry for workup of severe sepsis and treatment of acute renal 


failure and dehydration


Result Diagram:  


4/18/19 1459                                                                    


           4/18/19 1459





Results 24hrs





Laboratory Tests


Test
                           4/18/19
14:54  4/18/19
14:59       4/18/19
15:40


POC Venous Lactate                    5.6  *H


White Blood Count                                    21.2  H


Red Blood Count                                      6.39  H


Hemoglobin                                           19.1  H


Hematocrit                                           56.3  H


Mean Corpuscular Volume                               88.1


Mean Corpuscular Hemoglobin                           29.9


Mean Corpuscular                
                    33.9  
  



Hemoglobin
Concent


Red Cell Distribution Width                           12.5


Platelet Count                                        507  H


Mean Platelet Volume                                 10.5  H


Immature Granulocytes %                             0.800  H


Neutrophils %                                        79.0  H


Lymphocytes %                                         15.8


Monocytes %                                            3.8


Eosinophils %                                          0.0


Basophils %                                            0.6


Nucleated Red Blood Cells %                            0.0


Immature Granulocytes #                             0.170  H


Neutrophils #                                        16.8  H


Lymphocytes #                                         3.4  H


Monocytes #                                            0.8


Eosinophils #                                          0.0


Basophils #                                            0.1


Nucleated Red Blood Cells #                            0.0


Prothrombin Time                                      12.4


Prothrombin Time Ratio                                 1.0


INR International               
                    0.91  
  



Normalized
Ratio


Activated Partial
Thromboplast  
                    27.0  
  



Time


Sodium Level                                          131  L


Potassium Level                                       5.2  H


Chloride Level                                         100


Carbon Dioxide Level                                   13  L


Anion Gap                                              18  H


Blood Urea Nitrogen                                    22  H


Creatinine                                           2.13  H


Est Glomerular Filtrat          
                     33  L
  



Rate
mL/min


Glucose Level                                         268  H


Calcium Level                                        10.4  H


Total Bilirubin                                        1.1


Direct Bilirubin                                      0.00


Indirect Bilirubin                                     1.1


Aspartate Amino                 
                     57  H
  



Transf
(AST/SGOT)


Alanine                         
                      20  
  



Aminotransferase
(ALT/SGPT)


Alkaline Phosphatase                                   120


Troponin I                                     < 0.012


Total Protein                                          7.3


Albumin                                                3.7


Globulin                                             3.60  H


Albumin/Globulin Ratio                                1.02


Urine Color                                                   YELLOW


Urine Clarity
                  
              
              SLIGHTLY
CLOUDY  A


Urine pH                                                                   5.0


Urine Specific Gravity                                                   1.025


Urine Ketones                                                 NEGATIVE


Urine Nitrite                                                 NEGATIVE


Urine Bilirubin                                               NEGATIVE


Urine Urobilinogen                                            NEGATIVE


Urine Leukocyte Esterase                                      NEGATIVE


Urine Microscopic RBC                                                        4


Urine Microscopic WBC                                                       7  H


Urine Mucus                                                   FEW  A


Urine Hemoglobin                                              NEGATIVE


Urine Glucose                                                 NEGATIVE


Urine Total Protein                                           NEGATIVE








HPI/ROS


Admit Date/Time


Admit Date/Time


4/18/19 1630





Hx of Present Illness


52 yo M with PMH alcohol, tobacco, and IVDU (meth) presented to ED with 


complaints of shortness of breath, itching, and generalized rash since last 


night.  Patient states at dinner last night he started to feel unwell but 


decided not to go to the ED.  This am he was experiencing worsening symptoms 


with new onset SOB and was brought to the ED.  Patient admits to IVDU 2 days ago


and was seen previously last week in the ED due to a draining forearm abscess.  


Patient was discharged from ED with PO antibiotics but states he lost half the 


bottle.  Patient denies any new foods, lotions, detergents, or contact with 


anything that may have caused his rash.  He does admit to feeling as if he is 


withdrawing from meth at this time.  Patient is complaining of feeling cold.  He


also admits to abdominal discomfort, constipation, and experiencing nausea due 


to acid reflux.  Denies any fevers, chest pain, dizziness, urinary issues, 


palpitations, or paraesthesia of limbs.  Admits to smoking 1ppd and 1 alcoholic 


beverage daily.





ROS


All 12 systems reviewed and pertinent positives as per HPI.  All others 


negative.


Constitutional:  chills, nausea; 


   No febrile


Eyes:  No discharge


ENT:  No congestion


Respiratory:  shortness of breath; 


   No cough, No sputum, No wheezing


Cardiovascular:  No chest pain, No edema, No lightheadedness, No palpitations


Gastrointestinal:  pain, constipation, nausea; 


   No diarrhea, No vomiting


Genitourinary:  no complaints


Musculoskeletal:  no complaints


Skin:  rash, skin lesions; 


   No laceration


Neurologic:  No confusion, No focal-weakness, No syncope


Endocrine:  no complaints


Lymphatic:  no complaints


Psychological:  nl mood/affect


Immunologic:  no complaints





PMH/Family/Social


Past Medical History


Medical History:  other (IVDU, tobacco abuse, alcohol abuse)


Medications





Current Medications


Vancomycin HCl 250 ml @  125 mls/hr ONCE  ONCE IVPB  Last administered on 


4/18/19at 15:49; Admin Dose 125 MLS/HR;  Start 4/18/19 at 15:00;  Stop 4/18/19 


at 16:59


Ondansetron HCl (Zofran Inj) 4 mg ER BRIDGE PRN IV NAUSEA/VOMITING;  Start 


4/18/19 at 16:30;  Stop 4/19/19 at 16:29


Acetaminophen (Tylenol Tab) 650 mg ER BRIDGE PRN PO .MILD PAIN 1-3 OR TEMP;  


Start 4/18/19 at 16:30;  Stop 4/19/19 at 16:29


Sodium Chloride 1,000 ml @  100 mls/hr Q10H IV ;  Start 4/18/19 at 16:22;  


Status UNV


IV Flush (NS 3 ml) 3 ml PER PROTOCOL IV ;  Start 4/18/19 at 16:30;  Status UNV


Lorazepam (Ativan) 0.5 mg Q6H  PRN IV .ANXIETY;  Start 4/18/19 at 16:30;  Status


UNV


Ondansetron HCl (Zofran Inj) 4 mg Q6H  PRN IV NAUSEA/VOMITING;  Start 4/18/19 at


16:30;  Status UNV


Acetaminophen (Tylenol Tab) 650 mg Q6H  PRN PO .PAIN 1-3 OR TEMP;  Start 4/18/19


at 16:30;  Status UNV


Acetaminophen/ Hydrocodone Bitart (Norco (5/325)) 1 tab Q6H  PRN PO .PAIN 4-6;  


Start 4/18/19 at 16:30;  Status UNV


Acetaminophen/ Hydrocodone Bitart (Norco (5/325)) 2 tab Q6H  PRN PO .PAIN 7-10; 


Start 4/18/19 at 16:30;  Status UNV


Morphine Sulfate (morphine) 2 mg Q4H  PRN IV .PAIN 7-10;  Start 4/18/19 at 16:30


;  Status UNV


Docusate Sodium (Colace) 100 mg Q12H  PRN PO .CONSTIPATION;  Start 4/18/19 at 


16:30;  Status UNV


Magnesium Hydroxide (Milk Of Mag) 30 ml DAILY  PRN PO .CONSTIPATION;  Start 


4/18/19 at 16:30;  Status UNV


Pantoprazole (Protonix Tab) 40 mg DAILY@06 PO ;  Start 4/19/19 at 06:00;  Status


UNV


Heparin Sodium (Porcine) (Heparin  (5000 Units/1ml)) 5,000 unit Q12 SC ;  Start 


4/18/19 at 21:00;  Status UNV


Vancomycin HCl (Vanco Iv Per Pharmacy) 1 ea PER PROTOCOL XX ;  Start 4/18/19 at 


16:30;  Status UNV


Piperacillin Sod/ Tazobactam Sod 100 ml @  200 mls/hr Q8 IVPB ;  Start 4/18/19 


at 22:00;  Status UNV


Diphenhydramine HCl (Benadryl) 25 mg Q6H  PRN IV itching;  Start 4/18/19 at 


17:00;  Status UNV


Dicyclomine HCl (Bentyl) 10 mg QID  PRN PO abdominal cramps;  Start 4/18/19 at 


17:00;  Status UNV


Clonidine (Catapres) 0.1 mg Q4H  PRN PO SBP >170;  Start 4/18/19 at 17:00;  


Status UNV


Lorazepam (Ativan) 1 mg Q4  PRN IV agitation, withdrawal;  Start 4/18/19 at 


17:00;  Status UNV


Coded Allergies:  


     No Known Drug Allergies (Verified  Allergy, Unknown, 4/18/19)





Past Surgical History


Past Surgical Hx:  other (hand surgery)





Family History


Significant Family History:  no pertinent family hx





Social History


Alcohol Use:  occasionally


Smoking Status:  Current every day smoker


Drug Use:  other (meth)





Exam/Review of Systems


Vital Signs


Vitals





Vital Signs


  Date      Temp  Pulse  Resp  B/P (MAP)   Pulse Ox  O2          O2 Flow    FiO2


Time                                                 Delivery    Rate


   4/18/19          112    18      120/96       100  Nasal             2.0


     16:30                          (104)            Cannula


   4/18/19  97.7


     14:38








Exam


Exam


General: Patient is a pleasant male, shivering, distress due to discomfort


HEENT: Atraumatic, normocephalic. The pupils are equal, round and reactive. 


Extraocular motor are intact


Neck: Supple with full range of motion. No rigidity or meningismus


Lungs: Clear to auscultation bilaterally no crackles rales or wheezing


Heart: Normal S1-S2, Regular rhythm, tachycardia, no murmurs


Abdomen: Soft , difffusely tender,  nondistended , bowel sounds are present. No 


guarding no rebound tenderness , No masses or organomegaly. No costovertebral 


temporal angle mass


Extremities: Normal to inspection, no edema no cyanosis


Neurologic: Normal mental status, speech normal, cranial nerves II through XII 


are intact, motor and sensory are intact


Skin: diffuse scabs/pot marks forearms and legs bilaterally. no purulent 


drainage appreciated. tattoos


Additional Comments


No home medications





PROCEDURE:   XR Chest AP portable 


 


CLINICAL INDICATION:   Sepsis 


 


TECHNIQUE:   An AP portable radiograph of the chest was submitted. 


 


COMPARISON:   None. 


 


FINDINGS:


 


Support Hardware: None


 


Cardiovascular: The cardiovascular silhouette appears unremarkable.


 


Lung Fields: The lung fields appear clear with no nodule, alveolar infiltrate, 


or interstitial prominence evident.


 


Pleural Spaces: No pneumothorax or pleural effusion is identified.


 


Osseous Structures: There is a mild apparent levoscoliotic curve to the thoracic


spine.


 


Soft Tissues: The soft tissues appear unremarkable.


 


IMPRESSION: Unremarkable portable chest without evidence of active 


cardiopulmonary disease.


_____________________________________________ 


Physician Kai           Date    Time 


Electronically viewed and signed by Physician Kai on 04/18/2019 15:19











ANDREAS THORPE MD                 Apr 18, 2019 17:12

## 2019-04-18 NOTE — CONS
DATE OF ADMISSION: 04/18/2019

DATE OF CONSULTATION:  04/18/2019

 

 

 

TYPE OF CONSULTATION:  Infectious disease.

 

REASON FOR CONSULTATION:  Antibiotic management.

 

HISTORY OF PRESENT ILLNESS:  Sanya Whitlock is a 53-year-old male who comes in with shortness of leticia
th, body rash and _____.  The patient has a history of IV drug abuse and presents with full body pain
.  He was seen on 04/11/2019 seven days ago and was diagnosed with an abscess I believe on his hand a
nd was discharged with Bactrim and Keflex, but it is getting worse.

 

PAST PROBLEMS:  Include:  IV drug abuse, use of crystal meth and methamphetamine.

 

HABITS:  He does drink, he does smoke and he uses drugs.

 

FAMILY HISTORY:  Noncontributory.

 

ALLERGIES:  NONE TO PENICILLIN, SULFA OR FOODS.

 

MEDICATIONS:  Per chart.

 

REVIEW OF SYSTEMS:  As per HPI.

 

PHYSICAL EXAMINATION:

GENERAL:  The patient is well-developed, well-nourished male who is complaining of pain in moderate d
istress.

VITAL SIGNS:  Stable.  He is afebrile, blood pressure is 106/57.

SKIN:  Without generalized rash.  He has multiple skin ulcers with track marks from IV drug use.

HEENT:  Within normal limits.

NECK:  Supple.

LYMPH NODES:  None palpable.

CHEST:  Decreased breath sounds at the bases.

HEART:  Without murmur or gallop.

ABDOMEN:  Soft, nontender, without organosplenomegaly or masses.

EXTREMITIES:  Without cyanosis, clubbing or edema.

RECTAL AND GENITAL:  Deferred.

NEUROLOGICAL:  No focal neurological abnormality.

 

ANCILLARY LABORATORY DATA:  White count of 21.2 with 79% polys, H and H 19.1 and 56.3, platelet count
 507,000.  BUN and creatinine is 22/2.13, random glucose of 268.  The patient was started on vancomyc
in and Zosyn.

 

DIAGNOSTIC DATA:  Chest x-ray shows no acute cardiopulmonary problems.

 

IMPRESSION AND PLAN:  On his last admission, the patient had a right wrist abscess from IV drug abuse
.  Currently, he has IV drug abuse.  He will be pan, cultured get a 2D echocardiogram to assess for e
ndocarditis.  The patient has a high risk for bacteremia, has prerenal azotemia, generalized rash for
 which he received Benadryl.  The patient is currently placed on vancomycin and Zosyn.  We will await
 his cultures.  As noted, he has skin lesions and rash and of course may be bacteremic.  Blood cultur
es were ordered.  Urine cultures were ordered.  Stool cultures were ordered.  I concur with the curre
nt regimen.  I will dictate my findings to the hospitalist, Dr. Ibanez.

 

 

Dictated By: JERROLD DREYER MD JD/NTS

DD:    04/18/2019 20:02:35

DT:    04/18/2019 20:35:21

Conf#: [id number]

DID#:  [id number]

CC: ANDREAS IBANEZ MD;*EndCC*

## 2019-04-19 VITALS — HEART RATE: 113 BPM

## 2019-04-19 VITALS — HEART RATE: 101 BPM

## 2019-04-19 VITALS — HEART RATE: 118 BPM | DIASTOLIC BLOOD PRESSURE: 69 MMHG | RESPIRATION RATE: 18 BRPM | SYSTOLIC BLOOD PRESSURE: 126 MMHG

## 2019-04-19 VITALS — DIASTOLIC BLOOD PRESSURE: 56 MMHG | HEART RATE: 110 BPM | SYSTOLIC BLOOD PRESSURE: 103 MMHG | RESPIRATION RATE: 19 BRPM

## 2019-04-19 VITALS — DIASTOLIC BLOOD PRESSURE: 60 MMHG | RESPIRATION RATE: 18 BRPM | HEART RATE: 117 BPM | SYSTOLIC BLOOD PRESSURE: 117 MMHG

## 2019-04-19 VITALS — HEART RATE: 115 BPM

## 2019-04-19 LAB
ADD MAN DIFF?: NO
ALANINE AMINOTRANSFERASE: 26 IU/L (ref 13–69)
ALBUMIN/GLOBULIN RATIO: 1.12
ALBUMIN: 2.7 G/DL (ref 3.3–4.9)
ALKALINE PHOSPHATASE: 64 IU/L (ref 42–121)
ANION GAP: 8 (ref 5–13)
ASPARTATE AMINO TRANSFERASE: 21 IU/L (ref 15–46)
BASOPHIL #: 0 10^3/UL (ref 0–0.1)
BASOPHILS %: 0.3 % (ref 0–2)
BILIRUBIN,DIRECT: 0 MG/DL (ref 0–0.2)
BILIRUBIN,TOTAL: 0.9 MG/DL (ref 0.2–1.3)
BLOOD UREA NITROGEN: 20 MG/DL (ref 7–20)
CALCIUM: 8.5 MG/DL (ref 8.4–10.2)
CARBON DIOXIDE: 25 MMOL/L (ref 21–31)
CHLORIDE: 101 MMOL/L (ref 97–110)
CREATININE: 1.06 MG/DL (ref 0.61–1.24)
EOSINOPHILS #: 0 10^3/UL (ref 0–0.5)
EOSINOPHILS %: 0.3 % (ref 0–7)
GLOBULIN: 2.4 G/DL (ref 1.3–3.2)
GLUCOSE: 120 MG/DL (ref 70–220)
HEMATOCRIT: 40.3 % (ref 42–52)
HEMOGLOBIN: 13.2 G/DL (ref 14–18)
IMMATURE GRANS #M: 0.05 10^3/UL (ref 0–0.03)
IMMATURE GRANS % (M): 0.4 % (ref 0–0.43)
LACTIC ACID: 1.7 MMOL/L (ref 0.5–2)
LACTIC ACID: 2.5 MMOL/L (ref 0.5–2)
LACTIC ACID: 3.7 MMOL/L (ref 0.5–2)
LYMPHOCYTES #: 3.7 10^3/UL (ref 0.8–2.9)
LYMPHOCYTES %: 31.1 % (ref 15–51)
MAGNESIUM: 2.1 MG/DL (ref 1.7–2.5)
MEAN CORPUSCULAR HEMOGLOBIN: 29.7 PG (ref 29–33)
MEAN CORPUSCULAR HGB CONC: 32.8 G/DL (ref 32–37)
MEAN CORPUSCULAR VOLUME: 90.6 FL (ref 82–101)
MEAN PLATELET VOLUME: 10.8 FL (ref 7.4–10.4)
MONOCYTE #: 0.7 10^3/UL (ref 0.3–0.9)
MONOCYTES %: 5.6 % (ref 0–11)
NEUTROPHIL #: 7.3 10^3/UL (ref 1.6–7.5)
NEUTROPHILS %: 62.3 % (ref 39–77)
NUCLEATED RED BLOOD CELLS #: 0 10^3/UL (ref 0–0)
NUCLEATED RED BLOOD CELLS%: 0 /100WBC (ref 0–0)
PLATELET COUNT: 330 10^3/UL (ref 140–415)
POTASSIUM: 4.5 MMOL/L (ref 3.5–5.1)
RED BLOOD COUNT: 4.45 10^6/UL (ref 4.7–6.1)
RED CELL DISTRIBUTION WIDTH: 13 % (ref 11.5–14.5)
SODIUM: 134 MMOL/L (ref 135–144)
TOTAL PROTEIN: 5.1 G/DL (ref 6.1–8.1)
WHITE BLOOD COUNT: 11.8 10^3/UL (ref 4.8–10.8)

## 2019-04-19 RX ADMIN — NICOTINE SCH PATCH: 21 PATCH, EXTENDED RELEASE TRANSDERMAL at 12:12

## 2019-04-19 RX ADMIN — PANTOPRAZOLE SODIUM SCH MG: 40 TABLET, DELAYED RELEASE ORAL at 17:23

## 2019-04-19 RX ADMIN — DOCUSATE SODIUM 1 MG: 100 CAPSULE, LIQUID FILLED ORAL at 04:31

## 2019-04-19 RX ADMIN — PIPERACILLIN SODIUM AND TAZOBACTAM SODIUM SCH MLS/HR: 3; .375 INJECTION, POWDER, LYOPHILIZED, FOR SOLUTION INTRAVENOUS at 05:30

## 2019-04-19 RX ADMIN — HEPARIN SODIUM 1 UNIT: 5000 INJECTION, SOLUTION INTRAVENOUS; SUBCUTANEOUS at 22:24

## 2019-04-19 RX ADMIN — FOLIC ACID SCH MLS/HR: 5 INJECTION, SOLUTION INTRAMUSCULAR; INTRAVENOUS; SUBCUTANEOUS at 18:22

## 2019-04-19 RX ADMIN — PIPERACILLIN SODIUM AND TAZOBACTAM SODIUM SCH MLS/HR: 3; .375 INJECTION, POWDER, LYOPHILIZED, FOR SOLUTION INTRAVENOUS at 14:52

## 2019-04-19 RX ADMIN — CHLORDIAZEPOXIDE HYDROCHLORIDE 1 MG: 25 CAPSULE ORAL at 08:47

## 2019-04-19 RX ADMIN — PANTOPRAZOLE SODIUM 1 MG: 40 TABLET, DELAYED RELEASE ORAL at 17:23

## 2019-04-19 RX ADMIN — THIAMINE HYDROCHLORIDE 1 MLS/HR: 100 INJECTION, SOLUTION INTRAMUSCULAR; INTRAVENOUS at 18:22

## 2019-04-19 RX ADMIN — HEPARIN SODIUM 1 UNIT: 5000 INJECTION, SOLUTION INTRAVENOUS; SUBCUTANEOUS at 08:58

## 2019-04-19 RX ADMIN — PIPERACILLIN SODIUM AND TAZOBACTAM SODIUM 1 MLS/HR: 3; .375 INJECTION, POWDER, LYOPHILIZED, FOR SOLUTION INTRAVENOUS at 05:30

## 2019-04-19 RX ADMIN — HEPARIN SODIUM SCH UNIT: 5000 INJECTION, SOLUTION INTRAVENOUS; SUBCUTANEOUS at 08:58

## 2019-04-19 RX ADMIN — DOCUSATE SODIUM SCH MG: 100 CAPSULE, LIQUID FILLED ORAL at 04:31

## 2019-04-19 RX ADMIN — FOLIC ACID SCH MLS/HR: 5 INJECTION, SOLUTION INTRAMUSCULAR; INTRAVENOUS; SUBCUTANEOUS at 12:14

## 2019-04-19 RX ADMIN — BACLOFEN 1 MG: 10 TABLET ORAL at 14:52

## 2019-04-19 RX ADMIN — CALCIUM CARBONATE (ANTACID) CHEW TAB 500 MG 1 MG: 500 CHEW TAB at 14:52

## 2019-04-19 RX ADMIN — DOCUSATE SODIUM 1 MG: 100 CAPSULE, LIQUID FILLED ORAL at 17:23

## 2019-04-19 RX ADMIN — CHLORDIAZEPOXIDE HYDROCHLORIDE 1 MG: 25 CAPSULE ORAL at 22:19

## 2019-04-19 RX ADMIN — HEPARIN SODIUM SCH UNIT: 5000 INJECTION, SOLUTION INTRAVENOUS; SUBCUTANEOUS at 22:24

## 2019-04-19 RX ADMIN — LACTULOSE 1 GM: 20 SOLUTION ORAL at 14:52

## 2019-04-19 RX ADMIN — LORAZEPAM PRN MG: 2 INJECTION, SOLUTION INTRAMUSCULAR; INTRAVENOUS at 14:52

## 2019-04-19 RX ADMIN — CHLORDIAZEPOXIDE HYDROCHLORIDE SCH MG: 25 CAPSULE ORAL at 08:47

## 2019-04-19 RX ADMIN — THIAMINE HYDROCHLORIDE 1 MLS/HR: 100 INJECTION, SOLUTION INTRAMUSCULAR; INTRAVENOUS at 02:21

## 2019-04-19 RX ADMIN — CHLORDIAZEPOXIDE HYDROCHLORIDE 1 MG: 25 CAPSULE ORAL at 12:12

## 2019-04-19 RX ADMIN — CHLORDIAZEPOXIDE HYDROCHLORIDE SCH MG: 25 CAPSULE ORAL at 22:19

## 2019-04-19 RX ADMIN — THIAMINE HYDROCHLORIDE 1 MLS/HR: 100 INJECTION, SOLUTION INTRAMUSCULAR; INTRAVENOUS at 12:14

## 2019-04-19 RX ADMIN — FOLIC ACID SCH MLS/HR: 5 INJECTION, SOLUTION INTRAMUSCULAR; INTRAVENOUS; SUBCUTANEOUS at 02:22

## 2019-04-19 RX ADMIN — THIAMINE HYDROCHLORIDE 1 MLS/HR: 100 INJECTION, SOLUTION INTRAMUSCULAR; INTRAVENOUS at 02:22

## 2019-04-19 RX ADMIN — ALUMINUM HYDROXIDE, MAGNESIUM HYDROXIDE, AND SIMETHICONE 1 ML: 200; 200; 20 SUSPENSION ORAL at 04:29

## 2019-04-19 RX ADMIN — PANTOPRAZOLE SODIUM 1 MG: 40 TABLET, DELAYED RELEASE ORAL at 05:30

## 2019-04-19 RX ADMIN — LORAZEPAM 1 MG: 2 INJECTION, SOLUTION INTRAMUSCULAR; INTRAVENOUS at 14:52

## 2019-04-19 RX ADMIN — MAGNESIUM HYDROXIDE 1 ML: 400 SUSPENSION ORAL at 08:46

## 2019-04-19 RX ADMIN — DOCUSATE SODIUM SCH MG: 100 CAPSULE, LIQUID FILLED ORAL at 17:23

## 2019-04-19 RX ADMIN — PIPERACILLIN SODIUM AND TAZOBACTAM SODIUM 1 MLS/HR: 3; .375 INJECTION, POWDER, LYOPHILIZED, FOR SOLUTION INTRAVENOUS at 14:52

## 2019-04-19 RX ADMIN — VANCOMYCIN HYDROCHLORIDE 1 MLS/HR: 1 INJECTION, POWDER, LYOPHILIZED, FOR SOLUTION INTRAVENOUS at 08:46

## 2019-04-19 RX ADMIN — NICOTINE POLACRILEX 1 MG: 2 GUM, CHEWING BUCCAL at 12:12

## 2019-04-19 RX ADMIN — CHLORDIAZEPOXIDE HYDROCHLORIDE SCH MG: 25 CAPSULE ORAL at 12:12

## 2019-04-19 RX ADMIN — NICOTINE 1 PATCH: 21 PATCH, EXTENDED RELEASE TRANSDERMAL at 12:12

## 2019-04-19 NOTE — PN
Date/Time of Note


Date/Time of Note


DATE: 4/19/19 


TIME: 14:18





Assessment/Plan


VTE Prophylaxis


SCD applied (from Nsg):  Yes


Pharmacological prophylaxis:  LMWH





Lines/Catheters


IV Catheter Type (from Nrsg):  Mid Line





Assessment/Plan


Assessment/Plan


1. Septic shock, resolving


-  Patient WBC improved significantly but still slightly elevated


- ECHO pending for assessment of endocarditis


- LA normalized


- ID consultation appreciate for antibiotic recommendations


- wounds on limbs do not appear infectious but high risk of bacteremia given 


drug history





2.  Acute renal failure- resolved


- most likely prerenal





3.  Hypovolemic hyponatremia- improving


- Na 134 this am and will continue IVF until patient PO more optimal





4.  Generalized rash- resolved


-  unknown etiology


- Benadryl PRN for relief





5.  hx IVDU


- SW consultation appreciated.  no desire to quit or go to rehab at this time


- supportive care for withdrawal sx


- Utox + for amphetamines and cannabinoids





6.  tobacco abuse


- nicotine patch and gum





7.  ETOH use


- Librium taper





8. GERD


- PPI BID





9.  Hiccups


- will try Baclofen





10.  Disposition


- Awaiting culture results to determine source of sepsis.  ECHO results pending 


as well


Result Diagram:  


4/19/19 0710                                                                    


           4/19/19 0708





Results 24hrs





Laboratory Tests


Test
              4/18/19
14:48  4/18/19
14:54  4/18/19
14:59     4/18/19
15:40


Urine Opiates      Positive


Screen


Urine              Negative


Barbiturates


Urine              POSITIVE


Amphetamines


Screen


Urine              Negative


Benzodiazepines


Screen


Urine Cocaine      Negative


Screen


Urine              Positive


Cannabinoids


POC Venous                              5.6  *H


Lactate


White Blood Count                                      21.2  H


Red Blood Count                                        6.39  H


Hemoglobin                                             19.1  H


Hematocrit                                             56.3  H


Mean Corpuscular                                        88.1


Volume


Mean Corpuscular                                        29.9


Hemoglobin


Mean Corpuscular   
              
                    33.9  
  



Hemoglobin
Concen


t


Red Cell                                                12.5


Distribution


Width


Platelet Count                                          507  H


Mean Platelet                                          10.5  H


Volume


Immature                                              0.800  H


Granulocytes %


Neutrophils %                                          79.0  H


Lymphocytes %                                           15.8


Monocytes %                                              3.8


Eosinophils %                                            0.0


Basophils %                                              0.6


Nucleated Red                                            0.0


Blood Cells %


Immature                                              0.170  H


Granulocytes #


Neutrophils #                                          16.8  H


Lymphocytes #                                           3.4  H


Monocytes #                                              0.8


Eosinophils #                                            0.0


Basophils #                                              0.1


Nucleated Red                                            0.0


Blood Cells #


Prothrombin Time                                        12.4


Prothrombin Time                                         1.0


Ratio


INR International  
              
                    0.91  
  



Normalized
Ratio


Activated          
              
                    27.0  
  



Partial
Thrombopl


ast Time


Sodium Level                                            131  L


Potassium Level                                         5.2  H


Chloride Level                                           100


Carbon Dioxide                                           13  L


Level


Anion Gap                                                18  H


Blood Urea                                               22  H


Nitrogen


Creatinine                                             2.13  H


Est Glomerular     
              
                     33  L
  



Filtrat


Rate
mL/min


Glucose Level                                           268  H


Hemoglobin A1c                                          6.0  H


Calcium Level                                          10.4  H


Total Bilirubin                                          1.1


Direct Bilirubin                                        0.00


Indirect                                                 1.1


Bilirubin


Aspartate Amino    
              
                     57  H
  



Transf
(AST/SGOT)


Alanine            
              
                      20  
  



Aminotransferase



(ALT/SGPT)


Alkaline                                                 120


Phosphatase


Troponin I                                       < 0.012


Total Protein                                            7.3


Albumin                                                  3.7


Globulin                                               3.60  H


Albumin/Globulin                                        1.02


Ratio


Urine Color                                                     YELLOW


Urine Clarity
     
              
              
              SLIGHTLY
CLOUDY


                                                                A


Urine pH                                                                   5.0


Urine Specific                                                           1.025


Gravity


Urine Ketones                                                   NEGATIVE


Urine Nitrite                                                   NEGATIVE


Urine Bilirubin                                                 NEGATIVE


Urine                                                           NEGATIVE


Urobilinogen


Urine Leukocyte                                                 NEGATIVE


Esterase


Urine Microscopic                                                            4


RBC


Urine Microscopic                                                           7  H


WBC


Urine Mucus                                                     FEW  A


Urine Hemoglobin                                                NEGATIVE


Urine Glucose                                                   NEGATIVE


Urine Total                                                     NEGATIVE


Protein


Test
              4/18/19
17:08  4/18/19
18:46  4/19/19
00:45     4/19/19
07:08


POC Venous               2.9  *H


Lactate


Lactic Acid Level                       2.3  *H        3.7  *H           2.5  *H


Sodium Level                                                              134  L


Potassium Level                                                            4.5


Chloride Level                                                             101


Carbon Dioxide                                                             25  #


Level


Anion Gap                                                                   8  #


Blood Urea                                                                  20


Nitrogen


Creatinine                                                               1.06  #


Est Glomerular     
              
              
              > 60  



Filtrat


Rate
mL/min


Glucose Level                                                             120  #


Calcium Level                                                              8.5


Magnesium Level                                                            2.1


Total Bilirubin                                                            0.9


Direct Bilirubin                                                          0.00


Indirect                                                                   0.9


Bilirubin


Aspartate Amino    
              
              
                        21  #



Transf
(AST/SGOT)


Alanine            
              
              
                         26  



Aminotransferase



(ALT/SGPT)


Alkaline                                                                    64


Phosphatase


Total Protein                                                            5.1  #L


Albumin                                                                  2.7  #L


Globulin                                                                  2.40


Albumin/Globulin                                                          1.12


Ratio


Test
              4/19/19
07:10  4/19/19
12:09  
              



White Blood Count       11.8  #H


Red Blood Count         4.45  #L


Hemoglobin              13.2  #L


Hematocrit              40.3  #L


Mean Corpuscular          90.6


Volume


Mean Corpuscular          29.7


Hemoglobin


Mean Corpuscular         32.8  
  
              
              



Hemoglobin
Concen


t


Red Cell                  13.0


Distribution


Width


Platelet Count            330  #


Mean Platelet            10.8  H


Volume


Immature                 0.400


Granulocytes %


Neutrophils %             62.3


Lymphocytes %             31.1


Monocytes %                5.6


Eosinophils %              0.3


Basophils %                0.3


Nucleated Red              0.0


Blood Cells %


Immature                0.050  H


Granulocytes #


Neutrophils #              7.3


Lymphocytes #             3.7  H


Monocytes #                0.7


Eosinophils #              0.0


Basophils #                0.0


Nucleated Red              0.0


Blood Cells #


Lactic Acid Level                         1.7








Subjective


24 Hr Interval Summary


Free Text/Dictation


Patient complaining of fatigue but also experiencing reflux symptoms and 


difficulty swallowing which started this am.  No respiratory issues noted.





Exam/Review of Systems


Exam


Vitals





Vital Signs


  Date      Temp  Pulse  Resp  B/P (MAP)   Pulse Ox  O2          O2 Flow    FiO2


Time                                                 Delivery    Rate


   4/19/19          115


     12:14


   4/19/19  97.9           18      126/69       100


     04:00                           (88)


   4/18/19                                           Room Air


     20:00


   4/18/19                                                             2.0


     18:48








Intake and Output





4/18/19 4/18/19 4/19/19





1414:59


22:59


06:59





IntakeIntake Total


2670 ml


800 ml





BalanceBalance


2670 ml


800 ml











Exam


General: Patient is a pleasant male, no acute distress


Neck: Supple 


Lungs: Clear to auscultation bilaterally no crackles rales or wheezing


Heart: Normal S1-S2, Regular rhythm, tachycardia, no murmurs


Abdomen: Soft , mildly tender,  nondistended , bowel sounds are present. No 


guarding no rebound tenderness 


Extremities: Normal to inspection, no edema no cyanosis


Skin: diffuse scabs/pot marks forearms. no drainage appreciated





Results


Results 24hrs





Laboratory Tests


Test
              4/18/19
14:48  4/18/19
14:54  4/18/19
14:59     4/18/19
15:40


Urine Opiates      Positive


Screen


Urine              Negative


Barbiturates


Urine              POSITIVE


Amphetamines


Screen


Urine              Negative


Benzodiazepines


Screen


Urine Cocaine      Negative


Screen


Urine              Positive


Cannabinoids


POC Venous                              5.6  *H


Lactate


White Blood Count                                      21.2  H


Red Blood Count                                        6.39  H


Hemoglobin                                             19.1  H


Hematocrit                                             56.3  H


Mean Corpuscular                                        88.1


Volume


Mean Corpuscular                                        29.9


Hemoglobin


Mean Corpuscular   
              
                    33.9  
  



Hemoglobin
Concen


t


Red Cell                                                12.5


Distribution


Width


Platelet Count                                          507  H


Mean Platelet                                          10.5  H


Volume


Immature                                              0.800  H


Granulocytes %


Neutrophils %                                          79.0  H


Lymphocytes %                                           15.8


Monocytes %                                              3.8


Eosinophils %                                            0.0


Basophils %                                              0.6


Nucleated Red                                            0.0


Blood Cells %


Immature                                              0.170  H


Granulocytes #


Neutrophils #                                          16.8  H


Lymphocytes #                                           3.4  H


Monocytes #                                              0.8


Eosinophils #                                            0.0


Basophils #                                              0.1


Nucleated Red                                            0.0


Blood Cells #


Prothrombin Time                                        12.4


Prothrombin Time                                         1.0


Ratio


INR International  
              
                    0.91  
  



Normalized
Ratio


Activated          
              
                    27.0  
  



Partial
Thrombopl


ast Time


Sodium Level                                            131  L


Potassium Level                                         5.2  H


Chloride Level                                           100


Carbon Dioxide                                           13  L


Level


Anion Gap                                                18  H


Blood Urea                                               22  H


Nitrogen


Creatinine                                             2.13  H


Est Glomerular     
              
                     33  L
  



Filtrat


Rate
mL/min


Glucose Level                                           268  H


Hemoglobin A1c                                          6.0  H


Calcium Level                                          10.4  H


Total Bilirubin                                          1.1


Direct Bilirubin                                        0.00


Indirect                                                 1.1


Bilirubin


Aspartate Amino    
              
                     57  H
  



Transf
(AST/SGOT)


Alanine            
              
                      20  
  



Aminotransferase



(ALT/SGPT)


Alkaline                                                 120


Phosphatase


Troponin I                                       < 0.012


Total Protein                                            7.3


Albumin                                                  3.7


Globulin                                               3.60  H


Albumin/Globulin                                        1.02


Ratio


Urine Color                                                     YELLOW


Urine Clarity
     
              
              
              SLIGHTLY
CLOUDY


                                                                A


Urine pH                                                                   5.0


Urine Specific                                                           1.025


Gravity


Urine Ketones                                                   NEGATIVE


Urine Nitrite                                                   NEGATIVE


Urine Bilirubin                                                 NEGATIVE


Urine                                                           NEGATIVE


Urobilinogen


Urine Leukocyte                                                 NEGATIVE


Esterase


Urine Microscopic                                                            4


RBC


Urine Microscopic                                                           7  H


WBC


Urine Mucus                                                     FEW  A


Urine Hemoglobin                                                NEGATIVE


Urine Glucose                                                   NEGATIVE


Urine Total                                                     NEGATIVE


Protein


Test
              4/18/19
17:08  4/18/19
18:46  4/19/19
00:45     4/19/19
07:08


POC Venous               2.9  *H


Lactate


Lactic Acid Level                       2.3  *H        3.7  *H           2.5  *H


Sodium Level                                                              134  L


Potassium Level                                                            4.5


Chloride Level                                                             101


Carbon Dioxide                                                             25  #


Level


Anion Gap                                                                   8  #


Blood Urea                                                                  20


Nitrogen


Creatinine                                                               1.06  #


Est Glomerular     
              
              
              > 60  



Filtrat


Rate
mL/min


Glucose Level                                                             120  #


Calcium Level                                                              8.5


Magnesium Level                                                            2.1


Total Bilirubin                                                            0.9


Direct Bilirubin                                                          0.00


Indirect                                                                   0.9


Bilirubin


Aspartate Amino    
              
              
                        21  #



Transf
(AST/SGOT)


Alanine            
              
              
                         26  



Aminotransferase



(ALT/SGPT)


Alkaline                                                                    64


Phosphatase


Total Protein                                                            5.1  #L


Albumin                                                                  2.7  #L


Globulin                                                                  2.40


Albumin/Globulin                                                          1.12


Ratio


Test
              4/19/19
07:10  4/19/19
12:09  
              



White Blood Count       11.8  #H


Red Blood Count         4.45  #L


Hemoglobin              13.2  #L


Hematocrit              40.3  #L


Mean Corpuscular          90.6


Volume


Mean Corpuscular          29.7


Hemoglobin


Mean Corpuscular         32.8  
  
              
              



Hemoglobin
Concen


t


Red Cell                  13.0


Distribution


Width


Platelet Count            330  #


Mean Platelet            10.8  H


Volume


Immature                 0.400


Granulocytes %


Neutrophils %             62.3


Lymphocytes %             31.1


Monocytes %                5.6


Eosinophils %              0.3


Basophils %                0.3


Nucleated Red              0.0


Blood Cells %


Immature                0.050  H


Granulocytes #


Neutrophils #              7.3


Lymphocytes #             3.7  H


Monocytes #                0.7


Eosinophils #              0.0


Basophils #                0.0


Nucleated Red              0.0


Blood Cells #


Lactic Acid Level                         1.7








Medications


Medication





Current Medications


Sodium Chloride 1,000 ml @  125 mls/hr Q8H IV  Last administered on 4/19/19at 


12:14; Admin Dose 125 MLS/HR;  Start 4/18/19 at 16:22


IV Flush (NS 3 ml) 3 ml PER PROTOCOL IV ;  Start 4/18/19 at 16:30


Lorazepam (Ativan) 0.5 mg Q6H  PRN IV .ANXIETY;  Start 4/18/19 at 16:30


Ondansetron HCl (Zofran Inj) 4 mg Q6H  PRN IV NAUSEA/VOMITING Last administered 


on 4/18/19at 20:08; Admin Dose 4 MG;  Start 4/18/19 at 16:30


Acetaminophen (Tylenol Tab) 650 mg Q6H  PRN PO .PAIN 1-3 OR TEMP;  Start 4/18/19


at 16:30


Acetaminophen/ Hydrocodone Bitart (Norco (5/325)) 1 tab Q6H  PRN PO .PAIN 4-6;  


Start 4/18/19 at 16:30


Acetaminophen/ Hydrocodone Bitart (Norco (5/325)) 2 tab Q6H  PRN PO .PAIN 7-10; 


Start 4/18/19 at 16:30


Morphine Sulfate (morphine) 2 mg Q4H  PRN IV .PAIN 7-10;  Start 4/18/19 at 16:30


Heparin Sodium (Porcine) (Heparin  (5000 Units/1ml)) 5,000 unit Q12 SC  Last 


administered on 4/19/19at 08:58; Admin Dose 5,000 UNIT;  Start 4/18/19 at 21:00


Vancomycin HCl (Vanco Iv Per Pharmacy) 1 ea PER PROTOCOL XX ;  Start 4/18/19 at 


16:30


Piperacillin Sod/ Tazobactam Sod 100 ml @  200 mls/hr Q8 IVPB  Last administered


on 4/19/19at 05:30; Admin Dose 200 MLS/HR;  Start 4/18/19 at 22:00


Diphenhydramine HCl (Benadryl) 25 mg Q6H  PRN IV itching Last administered on 


4/18/19at 20:09; Admin Dose 25 MG;  Start 4/18/19 at 17:00


Dicyclomine HCl (Bentyl) 10 mg QID  PRN PO abdominal cramps Last administered on


4/18/19at 17:02; Admin Dose 10 MG;  Start 4/18/19 at 17:00


Clonidine (Catapres) 0.1 mg Q4H  PRN PO SBP >170;  Start 4/18/19 at 17:00


Lorazepam (Ativan) 1 mg Q4  PRN IV agitation, withdrawal Last administered on 


4/18/19at 17:02; Admin Dose 1 MG;  Start 4/18/19 at 17:00


Chlordiazepoxide (Librium) 25 mg TID PO  Last administered on 4/19/19at 12:12; 


Admin Dose 25 MG;  Start 4/18/19 at 21:00


Docusate Sodium (Colace) 100 mg Q12H PO  Last administered on 4/19/19at 04:31; 


Admin Dose 100 MG;  Start 4/19/19 at 04:30


Magnesium Hydroxide (Milk Of Mag) 30 ml DAILY PO  Last administered on 4/19/19at


08:46; Admin Dose 30 ML;  Start 4/19/19 at 09:00


Al Hydrox/Mg Hydrox/Simethicone (Mag-Al Plus) 30 ml Q6H  PRN PO GASTROINTESTINAL


UPSET Last administered on 4/19/19at 04:29; Admin Dose 30 ML;  Start 4/18/19 at 


22:00


Nicotine (Nicoderm 21 Mg/ 24hr) 1 patch DAILY TRANSDERM  Last administered on 


4/19/19at 12:12; Admin Dose 1 PATCH;  Start 4/19/19 at 09:30


Nicotine Polacrilex (Nicorette) 2 mg Q2H  PRN BUCCAL craving Last administered 


on 4/19/19at 12:12; Admin Dose 2 MG;  Start 4/19/19 at 09:30


Lactulose (Enulose) 20 gm ONCE  PRN PO constipation;  Start 4/19/19 at 10:00;  


Stop 4/20/19 at 09:59


Vancomycin HCl 250 ml @  125 mls/hr Q12H IVPB ;  Start 4/19/19 at 20:00


Pantoprazole (Protonix Tab) 40 mg BID@0600,1800 PO ;  Start 4/19/19 at 18:00


Calcium Carbonate (Tums) 500 mg Q4  PRN PO reflux;  Start 4/19/19 at 14:00


Baclofen (Lioresal) 10 mg TID  PRN PO hiccpus;  Start 4/19/19 at 14:30











ANDREAS THORPE MD                 Apr 19, 2019 14:20

## 2019-04-19 NOTE — CONS
Assessment/Plan


Assessment/Plan


Hospital Course (Demo Recall)


Alert up with PT, looks comfortable afebrile WBC 11.8 platelets 330 no shift no 


bands BUN 20 creatinine 1.06, lactic acid 1.7





Blood culture pending urine culture negative preliminary





Antimicrobials: Vancomycin, Zosyn





Physical examination: Well-developed well-nourished middle-aged white man who is


alert in no distress.  Head atraumatic normocephalic neck is supple.  Chest rise


symmetrical breath sounds clear.  Heart: S1-S2.  Abdomen soft bowel sounds 


present.  Extremities with bilateral upper extremity multiple lesions





Assessment:


1.  Sepsis, present on admission


2.  Multiple skin lesions likely staph


3.  IV drug user


4.  Acute renal failure on admission





Plan: Patient is doing better, on appropriate antibiotic regimen, pending 2D 


echo and blood cultures





Consultation Date/Type/Reason


Admit Date/Time


Apr 18, 2019 at 16:22


Initial Consult Date





Type of Consult


id


Date/Time of Note


DATE: 4/19/19 


TIME: 14:01





Exam/Review of Systems


Exam


Vitals





Vital Signs


  Date      Temp  Pulse  Resp  B/P (MAP)   Pulse Ox  O2          O2 Flow    FiO2


Time                                                 Delivery    Rate


   4/19/19          115


     12:14


   4/19/19  97.9           18      126/69       100


     04:00                           (88)


   4/18/19                                           Room Air


     20:00


   4/18/19                                                             2.0


     18:48








Intake and Output





4/18/19 4/18/19 4/19/19





1515:00


23:00


07:00





IntakeIntake Total


2670 ml


800 ml





BalanceBalance


2670 ml


800 ml














Results


Result Diagram:  


4/19/19 0710                                                                    


           4/19/19 0708





Results 24hrs





Laboratory Tests


Test
              4/18/19
14:48  4/18/19
14:54  4/18/19
14:59     4/18/19
15:40


Urine Opiates      Positive


Screen


Urine              Negative


Barbiturates


Urine              POSITIVE


Amphetamines


Screen


Urine              Negative


Benzodiazepines


Screen


Urine Cocaine      Negative


Screen


Urine              Positive


Cannabinoids


POC Venous                              5.6  *H


Lactate


White Blood Count                                      21.2  H


Red Blood Count                                        6.39  H


Hemoglobin                                             19.1  H


Hematocrit                                             56.3  H


Mean Corpuscular                                        88.1


Volume


Mean Corpuscular                                        29.9


Hemoglobin


Mean Corpuscular   
              
                    33.9  
  



Hemoglobin
Concen


t


Red Cell                                                12.5


Distribution


Width


Platelet Count                                          507  H


Mean Platelet                                          10.5  H


Volume


Immature                                              0.800  H


Granulocytes %


Neutrophils %                                          79.0  H


Lymphocytes %                                           15.8


Monocytes %                                              3.8


Eosinophils %                                            0.0


Basophils %                                              0.6


Nucleated Red                                            0.0


Blood Cells %


Immature                                              0.170  H


Granulocytes #


Neutrophils #                                          16.8  H


Lymphocytes #                                           3.4  H


Monocytes #                                              0.8


Eosinophils #                                            0.0


Basophils #                                              0.1


Nucleated Red                                            0.0


Blood Cells #


Prothrombin Time                                        12.4


Prothrombin Time                                         1.0


Ratio


INR International  
              
                    0.91  
  



Normalized
Ratio


Activated          
              
                    27.0  
  



Partial
Thrombopl


ast Time


Sodium Level                                            131  L


Potassium Level                                         5.2  H


Chloride Level                                           100


Carbon Dioxide                                           13  L


Level


Anion Gap                                                18  H


Blood Urea                                               22  H


Nitrogen


Creatinine                                             2.13  H


Est Glomerular     
              
                     33  L
  



Filtrat


Rate
mL/min


Glucose Level                                           268  H


Hemoglobin A1c                                          6.0  H


Calcium Level                                          10.4  H


Total Bilirubin                                          1.1


Direct Bilirubin                                        0.00


Indirect                                                 1.1


Bilirubin


Aspartate Amino    
              
                     57  H
  



Transf
(AST/SGOT)


Alanine            
              
                      20  
  



Aminotransferase



(ALT/SGPT)


Alkaline                                                 120


Phosphatase


Troponin I                                       < 0.012


Total Protein                                            7.3


Albumin                                                  3.7


Globulin                                               3.60  H


Albumin/Globulin                                        1.02


Ratio


Urine Color                                                     YELLOW


Urine Clarity
     
              
              
              SLIGHTLY
CLOUDY


                                                                A


Urine pH                                                                   5.0


Urine Specific                                                           1.025


Gravity


Urine Ketones                                                   NEGATIVE


Urine Nitrite                                                   NEGATIVE


Urine Bilirubin                                                 NEGATIVE


Urine                                                           NEGATIVE


Urobilinogen


Urine Leukocyte                                                 NEGATIVE


Esterase


Urine Microscopic                                                            4


RBC


Urine Microscopic                                                           7  H


WBC


Urine Mucus                                                     FEW  A


Urine Hemoglobin                                                NEGATIVE


Urine Glucose                                                   NEGATIVE


Urine Total                                                     NEGATIVE


Protein


Test
              4/18/19
17:08  4/18/19
18:46  4/19/19
00:45     4/19/19
07:08


POC Venous               2.9  *H


Lactate


Lactic Acid Level                       2.3  *H        3.7  *H           2.5  *H


Sodium Level                                                              134  L


Potassium Level                                                            4.5


Chloride Level                                                             101


Carbon Dioxide                                                             25  #


Level


Anion Gap                                                                   8  #


Blood Urea                                                                  20


Nitrogen


Creatinine                                                               1.06  #


Est Glomerular     
              
              
              > 60  



Filtrat


Rate
mL/min


Glucose Level                                                             120  #


Calcium Level                                                              8.5


Magnesium Level                                                            2.1


Total Bilirubin                                                            0.9


Direct Bilirubin                                                          0.00


Indirect                                                                   0.9


Bilirubin


Aspartate Amino    
              
              
                        21  #



Transf
(AST/SGOT)


Alanine            
              
              
                         26  



Aminotransferase



(ALT/SGPT)


Alkaline                                                                    64


Phosphatase


Total Protein                                                            5.1  #L


Albumin                                                                  2.7  #L


Globulin                                                                  2.40


Albumin/Globulin                                                          1.12


Ratio


Test
              4/19/19
07:10  4/19/19
12:09  
              



White Blood Count       11.8  #H


Red Blood Count         4.45  #L


Hemoglobin              13.2  #L


Hematocrit              40.3  #L


Mean Corpuscular          90.6


Volume


Mean Corpuscular          29.7


Hemoglobin


Mean Corpuscular         32.8  
  
              
              



Hemoglobin
Concen


t


Red Cell                  13.0


Distribution


Width


Platelet Count            330  #


Mean Platelet            10.8  H


Volume


Immature                 0.400


Granulocytes %


Neutrophils %             62.3


Lymphocytes %             31.1


Monocytes %                5.6


Eosinophils %              0.3


Basophils %                0.3


Nucleated Red              0.0


Blood Cells %


Immature                0.050  H


Granulocytes #


Neutrophils #              7.3


Lymphocytes #             3.7  H


Monocytes #                0.7


Eosinophils #              0.0


Basophils #                0.0


Nucleated Red              0.0


Blood Cells #


Lactic Acid Level                         1.7








Medications


Medication





Current Medications


Sodium Chloride 1,000 ml @  125 mls/hr Q8H IV  Last administered on 4/19/19at 


12:14; Admin Dose 125 MLS/HR;  Start 4/18/19 at 16:22


IV Flush (NS 3 ml) 3 ml PER PROTOCOL IV ;  Start 4/18/19 at 16:30


Lorazepam (Ativan) 0.5 mg Q6H  PRN IV .ANXIETY;  Start 4/18/19 at 16:30


Ondansetron HCl (Zofran Inj) 4 mg Q6H  PRN IV NAUSEA/VOMITING Last administered 


on 4/18/19at 20:08; Admin Dose 4 MG;  Start 4/18/19 at 16:30


Acetaminophen (Tylenol Tab) 650 mg Q6H  PRN PO .PAIN 1-3 OR TEMP;  Start 4/18/19


at 16:30


Acetaminophen/ Hydrocodone Bitart (Norco (5/325)) 1 tab Q6H  PRN PO .PAIN 4-6;  


Start 4/18/19 at 16:30


Acetaminophen/ Hydrocodone Bitart (Norco (5/325)) 2 tab Q6H  PRN PO .PAIN 7-10; 


Start 4/18/19 at 16:30


Morphine Sulfate (morphine) 2 mg Q4H  PRN IV .PAIN 7-10;  Start 4/18/19 at 16:30


Pantoprazole (Protonix Tab) 40 mg DAILY@06 PO  Last administered on 4/19/19at 


05:30; Admin Dose 40 MG;  Start 4/19/19 at 06:00


Heparin Sodium (Porcine) (Heparin  (5000 Units/1ml)) 5,000 unit Q12 SC  Last 


administered on 4/19/19at 08:58; Admin Dose 5,000 UNIT;  Start 4/18/19 at 21:00


Vancomycin HCl (Vanco Iv Per Pharmacy) 1 ea PER PROTOCOL XX ;  Start 4/18/19 at 


16:30


Piperacillin Sod/ Tazobactam Sod 100 ml @  200 mls/hr Q8 IVPB  Last administered


on 4/19/19at 05:30; Admin Dose 200 MLS/HR;  Start 4/18/19 at 22:00


Diphenhydramine HCl (Benadryl) 25 mg Q6H  PRN IV itching Last administered on 


4/18/19at 20:09; Admin Dose 25 MG;  Start 4/18/19 at 17:00


Dicyclomine HCl (Bentyl) 10 mg QID  PRN PO abdominal cramps Last administered on


4/18/19at 17:02; Admin Dose 10 MG;  Start 4/18/19 at 17:00


Clonidine (Catapres) 0.1 mg Q4H  PRN PO SBP >170;  Start 4/18/19 at 17:00


Lorazepam (Ativan) 1 mg Q4  PRN IV agitation, withdrawal Last administered on 


4/18/19at 17:02; Admin Dose 1 MG;  Start 4/18/19 at 17:00


Chlordiazepoxide (Librium) 25 mg TID PO  Last administered on 4/19/19at 12:12; 


Admin Dose 25 MG;  Start 4/18/19 at 21:00


Docusate Sodium (Colace) 100 mg Q12H PO  Last administered on 4/19/19at 04:31; 


Admin Dose 100 MG;  Start 4/19/19 at 04:30


Magnesium Hydroxide (Milk Of Mag) 30 ml DAILY PO  Last administered on 4/19/19at


08:46; Admin Dose 30 ML;  Start 4/19/19 at 09:00


Al Hydrox/Mg Hydrox/Simethicone (Mag-Al Plus) 30 ml Q6H  PRN PO GASTROINTESTINAL


UPSET Last administered on 4/19/19at 04:29; Admin Dose 30 ML;  Start 4/18/19 at 


22:00


Nicotine (Nicoderm 21 Mg/ 24hr) 1 patch DAILY TRANSDERM  Last administered on 


4/19/19at 12:12; Admin Dose 1 PATCH;  Start 4/19/19 at 09:30


Nicotine Polacrilex (Nicorette) 2 mg Q2H  PRN BUCCAL craving Last administered 


on 4/19/19at 12:12; Admin Dose 2 MG;  Start 4/19/19 at 09:30


Lactulose (Enulose) 20 gm ONCE  PRN PO constipation;  Start 4/19/19 at 10:00;  


Stop 4/20/19 at 09:59


Vancomycin HCl 250 ml @  125 mls/hr Q12H IVPB ;  Start 4/19/19 at 20:00











AKBAR GARSIA NP            Apr 19, 2019 14:01

## 2019-04-20 VITALS — HEART RATE: 100 BPM

## 2019-04-20 VITALS — RESPIRATION RATE: 18 BRPM | HEART RATE: 97 BPM | SYSTOLIC BLOOD PRESSURE: 111 MMHG | DIASTOLIC BLOOD PRESSURE: 59 MMHG

## 2019-04-20 VITALS — HEART RATE: 99 BPM | RESPIRATION RATE: 18 BRPM | DIASTOLIC BLOOD PRESSURE: 61 MMHG | SYSTOLIC BLOOD PRESSURE: 103 MMHG

## 2019-04-20 VITALS — RESPIRATION RATE: 18 BRPM | HEART RATE: 103 BPM | SYSTOLIC BLOOD PRESSURE: 105 MMHG | DIASTOLIC BLOOD PRESSURE: 59 MMHG

## 2019-04-20 VITALS — RESPIRATION RATE: 18 BRPM | HEART RATE: 95 BPM | SYSTOLIC BLOOD PRESSURE: 104 MMHG | DIASTOLIC BLOOD PRESSURE: 60 MMHG

## 2019-04-20 VITALS — HEART RATE: 106 BPM

## 2019-04-20 VITALS — HEART RATE: 96 BPM

## 2019-04-20 VITALS — HEART RATE: 110 BPM

## 2019-04-20 LAB
ADD MAN DIFF?: NO
ALANINE AMINOTRANSFERASE: 24 IU/L (ref 13–69)
ALBUMIN/GLOBULIN RATIO: 1.1
ALBUMIN: 3.1 G/DL (ref 3.3–4.9)
ALKALINE PHOSPHATASE: 84 IU/L (ref 42–121)
ANION GAP: 8 (ref 5–13)
ASPARTATE AMINO TRANSFERASE: 24 IU/L (ref 15–46)
BASOPHIL #: 0 10^3/UL (ref 0–0.1)
BASOPHILS %: 0.2 % (ref 0–2)
BILIRUBIN,DIRECT: 0 MG/DL (ref 0–0.2)
BILIRUBIN,TOTAL: 0.2 MG/DL (ref 0.2–1.3)
BLOOD UREA NITROGEN: 10 MG/DL (ref 7–20)
CALCIUM: 8.3 MG/DL (ref 8.4–10.2)
CARBON DIOXIDE: 26 MMOL/L (ref 21–31)
CHLORIDE: 102 MMOL/L (ref 97–110)
CREATININE: 0.87 MG/DL (ref 0.61–1.24)
EOSINOPHILS #: 0.1 10^3/UL (ref 0–0.5)
EOSINOPHILS %: 0.9 % (ref 0–7)
GLOBULIN: 2.8 G/DL (ref 1.3–3.2)
GLUCOSE: 112 MG/DL (ref 70–220)
HEMATOCRIT: 35.5 % (ref 42–52)
HEMOGLOBIN: 11.5 G/DL (ref 14–18)
IMMATURE GRANS #M: 0.03 10^3/UL (ref 0–0.03)
IMMATURE GRANS % (M): 0.3 % (ref 0–0.43)
LYMPHOCYTES #: 3.2 10^3/UL (ref 0.8–2.9)
LYMPHOCYTES %: 32.6 % (ref 15–51)
MAGNESIUM: 2.4 MG/DL (ref 1.7–2.5)
MEAN CORPUSCULAR HEMOGLOBIN: 30.3 PG (ref 29–33)
MEAN CORPUSCULAR HGB CONC: 32.4 G/DL (ref 32–37)
MEAN CORPUSCULAR VOLUME: 93.4 FL (ref 82–101)
MEAN PLATELET VOLUME: 10.1 FL (ref 7.4–10.4)
MONOCYTE #: 0.5 10^3/UL (ref 0.3–0.9)
MONOCYTES %: 5.3 % (ref 0–11)
NEUTROPHIL #: 5.9 10^3/UL (ref 1.6–7.5)
NEUTROPHILS %: 60.7 % (ref 39–77)
NUCLEATED RED BLOOD CELLS #: 0 10^3/UL (ref 0–0)
NUCLEATED RED BLOOD CELLS%: 0 /100WBC (ref 0–0)
PLATELET COUNT: 235 10^3/UL (ref 140–415)
POTASSIUM: 3.8 MMOL/L (ref 3.5–5.1)
RED BLOOD COUNT: 3.8 10^6/UL (ref 4.7–6.1)
RED CELL DISTRIBUTION WIDTH: 12.9 % (ref 11.5–14.5)
SODIUM: 136 MMOL/L (ref 135–144)
TOTAL PROTEIN: 5.9 G/DL (ref 6.1–8.1)
WHITE BLOOD COUNT: 9.7 10^3/UL (ref 4.8–10.8)

## 2019-04-20 RX ADMIN — MAGNESIUM HYDROXIDE 1 ML: 400 SUSPENSION ORAL at 08:56

## 2019-04-20 RX ADMIN — PIPERACILLIN SODIUM AND TAZOBACTAM SODIUM 1 MLS/HR: 3; .375 INJECTION, POWDER, LYOPHILIZED, FOR SOLUTION INTRAVENOUS at 06:40

## 2019-04-20 RX ADMIN — PANTOPRAZOLE SODIUM 1 MG: 40 TABLET, DELAYED RELEASE ORAL at 05:24

## 2019-04-20 RX ADMIN — ACETAMINOPHEN 1 MG: 325 TABLET, FILM COATED ORAL at 06:38

## 2019-04-20 RX ADMIN — VANCOMYCIN HYDROCHLORIDE SCH MLS/HR: 1 INJECTION, POWDER, LYOPHILIZED, FOR SOLUTION INTRAVENOUS at 08:56

## 2019-04-20 RX ADMIN — NICOTINE 1 PATCH: 21 PATCH, EXTENDED RELEASE TRANSDERMAL at 08:56

## 2019-04-20 RX ADMIN — VANCOMYCIN HYDROCHLORIDE 1 MLS/HR: 1 INJECTION, POWDER, LYOPHILIZED, FOR SOLUTION INTRAVENOUS at 01:05

## 2019-04-20 RX ADMIN — CHLORDIAZEPOXIDE HYDROCHLORIDE SCH MG: 25 CAPSULE ORAL at 08:58

## 2019-04-20 RX ADMIN — DOCUSATE SODIUM SCH MG: 100 CAPSULE, LIQUID FILLED ORAL at 05:24

## 2019-04-20 RX ADMIN — PANTOPRAZOLE SODIUM SCH MG: 40 TABLET, DELAYED RELEASE ORAL at 05:24

## 2019-04-20 RX ADMIN — FOLIC ACID SCH MLS/HR: 5 INJECTION, SOLUTION INTRAMUSCULAR; INTRAVENOUS; SUBCUTANEOUS at 11:19

## 2019-04-20 RX ADMIN — CHLORDIAZEPOXIDE HYDROCHLORIDE 1 MG: 25 CAPSULE ORAL at 08:58

## 2019-04-20 RX ADMIN — PIPERACILLIN SODIUM AND TAZOBACTAM SODIUM SCH MLS/HR: 3; .375 INJECTION, POWDER, LYOPHILIZED, FOR SOLUTION INTRAVENOUS at 13:43

## 2019-04-20 RX ADMIN — PIPERACILLIN SODIUM AND TAZOBACTAM SODIUM SCH MLS/HR: 3; .375 INJECTION, POWDER, LYOPHILIZED, FOR SOLUTION INTRAVENOUS at 01:05

## 2019-04-20 RX ADMIN — CHLORDIAZEPOXIDE HYDROCHLORIDE 1 MG: 25 CAPSULE ORAL at 13:43

## 2019-04-20 RX ADMIN — MAGNESIUM HYDROXIDE 1 ML: 400 SUSPENSION ORAL at 06:39

## 2019-04-20 RX ADMIN — VANCOMYCIN HYDROCHLORIDE 1 MLS/HR: 1 INJECTION, POWDER, LYOPHILIZED, FOR SOLUTION INTRAVENOUS at 08:56

## 2019-04-20 RX ADMIN — DOCUSATE SODIUM 1 MG: 100 CAPSULE, LIQUID FILLED ORAL at 05:24

## 2019-04-20 RX ADMIN — PIPERACILLIN SODIUM AND TAZOBACTAM SODIUM 1 MLS/HR: 3; .375 INJECTION, POWDER, LYOPHILIZED, FOR SOLUTION INTRAVENOUS at 01:05

## 2019-04-20 RX ADMIN — CHLORDIAZEPOXIDE HYDROCHLORIDE SCH MG: 25 CAPSULE ORAL at 13:43

## 2019-04-20 RX ADMIN — PIPERACILLIN SODIUM AND TAZOBACTAM SODIUM 1 MLS/HR: 3; .375 INJECTION, POWDER, LYOPHILIZED, FOR SOLUTION INTRAVENOUS at 13:43

## 2019-04-20 RX ADMIN — VANCOMYCIN HYDROCHLORIDE SCH MLS/HR: 1 INJECTION, POWDER, LYOPHILIZED, FOR SOLUTION INTRAVENOUS at 01:05

## 2019-04-20 RX ADMIN — PIPERACILLIN SODIUM AND TAZOBACTAM SODIUM SCH MLS/HR: 3; .375 INJECTION, POWDER, LYOPHILIZED, FOR SOLUTION INTRAVENOUS at 06:40

## 2019-04-20 RX ADMIN — THIAMINE HYDROCHLORIDE 1 MLS/HR: 100 INJECTION, SOLUTION INTRAMUSCULAR; INTRAVENOUS at 11:19

## 2019-04-20 RX ADMIN — HEPARIN SODIUM SCH UNIT: 5000 INJECTION, SOLUTION INTRAVENOUS; SUBCUTANEOUS at 09:15

## 2019-04-20 RX ADMIN — THIAMINE HYDROCHLORIDE 1 MLS/HR: 100 INJECTION, SOLUTION INTRAMUSCULAR; INTRAVENOUS at 03:33

## 2019-04-20 RX ADMIN — NICOTINE SCH PATCH: 21 PATCH, EXTENDED RELEASE TRANSDERMAL at 08:56

## 2019-04-20 RX ADMIN — HEPARIN SODIUM 1 UNIT: 5000 INJECTION, SOLUTION INTRAVENOUS; SUBCUTANEOUS at 09:15

## 2019-04-20 RX ADMIN — FOLIC ACID SCH MLS/HR: 5 INJECTION, SOLUTION INTRAMUSCULAR; INTRAVENOUS; SUBCUTANEOUS at 03:33

## 2019-04-20 NOTE — PN
Date/Time of Note


Date/Time of Note


DATE: 4/20/19 


TIME: 12:33





Assessment/Plan


VTE Prophylaxis


Risk score (from Ns)>0 risk:  4


SCD applied (from Ns):  Yes


Pharmacological prophylaxis:  NA/contraindicated


Pharm contraindication:  low risk/ambulating





Lines/Catheters


IV Catheter Type (from [facility]):  Mid Line





Assessment/Plan


Assessment/Plan


1. Septic shock, resolved


- WBC and lactic acid normalized.  Appeared more of a stress reaction given no 


source of infection appreciated.  Panculture negative and remains afebrile. 


- ID consultation appreciated and recommending Bactrim for 7 days.  Okay for d/c





2.  Acute renal failure- resolved


- most likely prerenal





3.  Hypovolemic hyponatremia- resolved





4.  Generalized rash- resolved


-  unknown etiology


-  Benadryl PRN for relief





5.  hx IVDU


- SW consultation appreciated.  no desire to quit or go to rehab at this time


- supportive care for withdrawal sx


- Utox + for amphetamines and cannabinoids





6.  tobacco abuse


- nicotine patch and gum





7.  ETOH use


- Librium taper





8. GERD


- PPI BID





9.  Hiccups- resolved





10.  Disposition


- Medically stable for discharge home


Result Diagram:  


4/20/19 1132                                                                    


           4/20/19 1132





Results 24hrs





Laboratory Tests


               Test
                                4/20/19
11:32


               White Blood Count                            9.7


               Red Blood Count                            3.80  L


               Hemoglobin                                 11.5  L


               Hematocrit                                 35.5  L


               Mean Corpuscular Volume                     93.4


               Mean Corpuscular Hemoglobin                 30.3


               Mean Corpuscular Hemoglobin
Concent        32.4  



               Red Cell Distribution Width                 12.9


               Platelet Count                              235  #


               Mean Platelet Volume                        10.1


               Immature Granulocytes %                    0.300


               Neutrophils %                               60.7


               Lymphocytes %                               32.6


               Monocytes %                                  5.3


               Eosinophils %                                0.9


               Basophils %                                  0.2


               Nucleated Red Blood Cells %                  0.0


               Immature Granulocytes #                    0.030


               Neutrophils #                                5.9


               Lymphocytes #                               3.2  H


               Monocytes #                                  0.5


               Eosinophils #                                0.1


               Basophils #                                  0.0


               Nucleated Red Blood Cells #                  0.0


               Sodium Level                                 136


               Potassium Level                              3.8


               Chloride Level                               102


               Carbon Dioxide Level                          26


               Anion Gap                                      8


               Blood Urea Nitrogen                          10  #


               Creatinine                                  0.87


               Est Glomerular Filtrat Rate
mL/min   > 60  



               Glucose Level                                112


               Calcium Level                               8.3  L


               Magnesium Level                              2.4


               Total Bilirubin                              0.2


               Direct Bilirubin                            0.00


               Indirect Bilirubin                           0.2


               Aspartate Amino Transf
(AST/SGOT)            24  



               Alanine Aminotransferase
(ALT/SGPT)          24  



               Alkaline Phosphatase                          84


               Total Protein                               5.9  L


               Albumin                                     3.1  L


               Globulin                                    2.80


               Albumin/Globulin Ratio                      1.10








Subjective


24 Hr Interval Summary


Free Text/Dictation


Patient states hes feeling better and back to baseline.  No acute overnight 


events. Still with sticking while swallowing with reflux symptoms.





Exam/Review of Systems


Exam


Vitals





Vital Signs


  Date      Temp  Pulse  Resp  B/P (MAP)   Pulse Ox  O2          O2 Flow    FiO2


Time                                                 Delivery    Rate


   4/20/19           96


     12:28


   4/20/19  97.9           18      111/59        98  Room Air


     11:50                           (76)


   4/18/19                                                             2.0


     18:48








Intake and Output





4/19/19 4/19/19 4/20/19





1515:00


23:00


07:00





IntakeIntake Total


850 ml


600 ml





BalanceBalance


850 ml


600 ml











Exam


General: Patient is a pleasant male, no acute distress


Neck: Supple 


Lungs: Clear to auscultation bilaterally no crackles rales or wheezing


Heart: Normal S1-S2, Regular rhythm, tachycardia, no murmurs


Abdomen: Soft , nontender,  nondistended , bowel sounds are present. No guarding


no rebound tenderness 


Extremities: Normal to inspection, no edema no cyanosis


Skin: diffuse scabs/pot marks forearms. no drainage appreciated





Results


Results 24hrs





Laboratory Tests


               Test
                                4/20/19
11:32


               White Blood Count                            9.7


               Red Blood Count                            3.80  L


               Hemoglobin                                 11.5  L


               Hematocrit                                 35.5  L


               Mean Corpuscular Volume                     93.4


               Mean Corpuscular Hemoglobin                 30.3


               Mean Corpuscular Hemoglobin
Concent        32.4  



               Red Cell Distribution Width                 12.9


               Platelet Count                              235  #


               Mean Platelet Volume                        10.1


               Immature Granulocytes %                    0.300


               Neutrophils %                               60.7


               Lymphocytes %                               32.6


               Monocytes %                                  5.3


               Eosinophils %                                0.9


               Basophils %                                  0.2


               Nucleated Red Blood Cells %                  0.0


               Immature Granulocytes #                    0.030


               Neutrophils #                                5.9


               Lymphocytes #                               3.2  H


               Monocytes #                                  0.5


               Eosinophils #                                0.1


               Basophils #                                  0.0


               Nucleated Red Blood Cells #                  0.0


               Sodium Level                                 136


               Potassium Level                              3.8


               Chloride Level                               102


               Carbon Dioxide Level                          26


               Anion Gap                                      8


               Blood Urea Nitrogen                          10  #


               Creatinine                                  0.87


               Est Glomerular Filtrat Rate
mL/min   > 60  



               Glucose Level                                112


               Calcium Level                               8.3  L


               Magnesium Level                              2.4


               Total Bilirubin                              0.2


               Direct Bilirubin                            0.00


               Indirect Bilirubin                           0.2


               Aspartate Amino Transf
(AST/SGOT)            24  



               Alanine Aminotransferase
(ALT/SGPT)          24  



               Alkaline Phosphatase                          84


               Total Protein                               5.9  L


               Albumin                                     3.1  L


               Globulin                                    2.80


               Albumin/Globulin Ratio                      1.10








Medications


Medication





Current Medications


Sodium Chloride 1,000 ml @  125 mls/hr Q8H IV  Last administered on 4/20/19at 


11:19; Admin Dose 125 MLS/HR;  Start 4/18/19 at 16:22


IV Flush (NS 3 ml) 3 ml PER PROTOCOL IV ;  Start 4/18/19 at 16:30


Lorazepam (Ativan) 0.5 mg Q6H  PRN IV .ANXIETY;  Start 4/18/19 at 16:30


Ondansetron HCl (Zofran Inj) 4 mg Q6H  PRN IV NAUSEA/VOMITING Last administered 


on 4/18/19at 20:08; Admin Dose 4 MG;  Start 4/18/19 at 16:30


Acetaminophen (Tylenol Tab) 650 mg Q6H  PRN PO .PAIN 1-3 OR TEMP Last 


administered on 4/20/19at 06:38; Admin Dose 650 MG;  Start 4/18/19 at 16:30


Acetaminophen/ Hydrocodone Bitart (Norco (5/325)) 1 tab Q6H  PRN PO .PAIN 4-6;  


Start 4/18/19 at 16:30


Acetaminophen/ Hydrocodone Bitart (Norco (5/325)) 2 tab Q6H  PRN PO .PAIN 7-10; 


Start 4/18/19 at 16:30


Morphine Sulfate (morphine) 2 mg Q4H  PRN IV .PAIN 7-10;  Start 4/18/19 at 16:30


Heparin Sodium (Porcine) (Heparin  (5000 Units/1ml)) 5,000 unit Q12 SC  Last 


administered on 4/20/19at 09:15; Admin Dose 5,000 UNIT;  Start 4/18/19 at 21:00


Vancomycin HCl (Vanco Iv Per Pharmacy) 1 ea PER PROTOCOL XX ;  Start 4/18/19 at 


16:30


Piperacillin Sod/ Tazobactam Sod 100 ml @  200 mls/hr Q8 IVPB  Last administered


on 4/20/19at 06:40; Admin Dose 200 MLS/HR;  Start 4/18/19 at 22:00


Diphenhydramine HCl (Benadryl) 25 mg Q6H  PRN IV itching Last administered on 


4/18/19at 20:09; Admin Dose 25 MG;  Start 4/18/19 at 17:00


Dicyclomine HCl (Bentyl) 10 mg QID  PRN PO abdominal cramps Last administered on


4/18/19at 17:02; Admin Dose 10 MG;  Start 4/18/19 at 17:00


Clonidine (Catapres) 0.1 mg Q4H  PRN PO SBP >170;  Start 4/18/19 at 17:00


Lorazepam (Ativan) 1 mg Q4  PRN IV agitation, withdrawal Last administered on 


4/19/19at 14:52; Admin Dose 1 MG;  Start 4/18/19 at 17:00


Chlordiazepoxide (Librium) 25 mg TID PO  Last administered on 4/20/19at 08:58; 


Admin Dose 25 MG;  Start 4/18/19 at 21:00


Docusate Sodium (Colace) 100 mg Q12H PO  Last administered on 4/20/19at 05:24; 


Admin Dose 100 MG;  Start 4/19/19 at 04:30


Magnesium Hydroxide (Milk Of Mag) 30 ml DAILY PO  Last administered on 4/20/19at


08:56; Admin Dose 30 ML;  Start 4/19/19 at 09:00


Al Hydrox/Mg Hydrox/Simethicone (Mag-Al Plus) 30 ml Q6H  PRN PO GASTROINTESTINAL


UPSET Last administered on 4/19/19at 04:29; Admin Dose 30 ML;  Start 4/18/19 at 


22:00


Nicotine (Nicoderm 21 Mg/ 24hr) 1 patch DAILY TRANSDERM  Last administered on 


4/20/19at 08:56; Admin Dose 1 PATCH;  Start 4/19/19 at 09:30


Nicotine Polacrilex (Nicorette) 2 mg Q2H  PRN BUCCAL craving Last administered 


on 4/19/19at 12:12; Admin Dose 2 MG;  Start 4/19/19 at 09:30


Vancomycin HCl 250 ml @  125 mls/hr Q12H IVPB  Last administered on 4/20/19at 


08:56; Admin Dose 125 MLS/HR;  Start 4/19/19 at 20:00


Pantoprazole (Protonix Tab) 40 mg BID@0600,1800 PO  Last administered on 


4/20/19at 05:24; Admin Dose 40 MG;  Start 4/19/19 at 18:00


Calcium Carbonate (Tums) 500 mg Q4  PRN PO reflux Last administered on 4/19/19at


14:52; Admin Dose 500 MG;  Start 4/19/19 at 14:00


Baclofen (Lioresal) 10 mg TID  PRN PO hiccpus Last administered on 4/19/19at 


14:52; Admin Dose 10 MG;  Start 4/19/19 at 14:30











ANDREAS THORPE MD                 Apr 20, 2019 12:36

## 2019-04-20 NOTE — CONS
Assessment/Plan


Assessment/Plan


Hospital Course (Demo Recall)


Alert feels good, no fevers overnight.





All cultures negative.





Antimicrobials: Vancomycin, Zosyn





Physical examination: Well-developed well-nourished middle-aged white man who is


alert in no distress.  Head atraumatic normocephalic neck is supple.  Chest rise


symmetrical breath sounds clear.  Heart: S1-S2.  Abdomen soft bowel sounds 


present.  Extremities with bilateral upper extremity multiple lesions





Assessment:


1.  Sepsis, present on admission


2.  Multiple skin lesions likely staph


3.  IV drug user


4.  Acute renal failure on admission





Plan: Patient is doing better, okay discharge on oral Bactrim for 7-10 days





Consultation Date/Type/Reason


Admit Date/Time


Apr 18, 2019 at 16:22


Initial Consult Date





Type of Consult


id


Date/Time of Note


DATE: 4/20/19 


TIME: 13:04





Exam/Review of Systems


Exam


Vitals





Vital Signs


  Date      Temp  Pulse  Resp  B/P (MAP)   Pulse Ox  O2          O2 Flow    FiO2


Time                                                 Delivery    Rate


   4/20/19           96


     12:28


   4/20/19  97.9           18      111/59        98  Room Air


     11:50                           (76)


   4/18/19                                                             2.0


     18:48








Intake and Output





4/19/19 4/19/19 4/20/19





1515:00


23:00


07:00





IntakeIntake Total


850 ml


600 ml





BalanceBalance


850 ml


600 ml














Results


Result Diagram:  


4/20/19 1132                                                                    


           4/20/19 1132





Results 24hrs





Laboratory Tests


               Test
                                4/20/19
11:32


               White Blood Count                            9.7


               Red Blood Count                            3.80  L


               Hemoglobin                                 11.5  L


               Hematocrit                                 35.5  L


               Mean Corpuscular Volume                     93.4


               Mean Corpuscular Hemoglobin                 30.3


               Mean Corpuscular Hemoglobin
Concent        32.4  



               Red Cell Distribution Width                 12.9


               Platelet Count                              235  #


               Mean Platelet Volume                        10.1


               Immature Granulocytes %                    0.300


               Neutrophils %                               60.7


               Lymphocytes %                               32.6


               Monocytes %                                  5.3


               Eosinophils %                                0.9


               Basophils %                                  0.2


               Nucleated Red Blood Cells %                  0.0


               Immature Granulocytes #                    0.030


               Neutrophils #                                5.9


               Lymphocytes #                               3.2  H


               Monocytes #                                  0.5


               Eosinophils #                                0.1


               Basophils #                                  0.0


               Nucleated Red Blood Cells #                  0.0


               Sodium Level                                 136


               Potassium Level                              3.8


               Chloride Level                               102


               Carbon Dioxide Level                          26


               Anion Gap                                      8


               Blood Urea Nitrogen                          10  #


               Creatinine                                  0.87


               Est Glomerular Filtrat Rate
mL/min   > 60  



               Glucose Level                                112


               Calcium Level                               8.3  L


               Magnesium Level                              2.4


               Total Bilirubin                              0.2


               Direct Bilirubin                            0.00


               Indirect Bilirubin                           0.2


               Aspartate Amino Transf
(AST/SGOT)            24  



               Alanine Aminotransferase
(ALT/SGPT)          24  



               Alkaline Phosphatase                          84


               Total Protein                               5.9  L


               Albumin                                     3.1  L


               Globulin                                    2.80


               Albumin/Globulin Ratio                      1.10








Medications


Medication





Current Medications


Sodium Chloride 1,000 ml @  125 mls/hr Q8H IV  Last administered on 4/20/19at 


11:19; Admin Dose 125 MLS/HR;  Start 4/18/19 at 16:22


IV Flush (NS 3 ml) 3 ml PER PROTOCOL IV ;  Start 4/18/19 at 16:30


Lorazepam (Ativan) 0.5 mg Q6H  PRN IV .ANXIETY;  Start 4/18/19 at 16:30


Ondansetron HCl (Zofran Inj) 4 mg Q6H  PRN IV NAUSEA/VOMITING Last administered 


on 4/18/19at 20:08; Admin Dose 4 MG;  Start 4/18/19 at 16:30


Acetaminophen (Tylenol Tab) 650 mg Q6H  PRN PO .PAIN 1-3 OR TEMP Last 


administered on 4/20/19at 06:38; Admin Dose 650 MG;  Start 4/18/19 at 16:30


Acetaminophen/ Hydrocodone Bitart (Norco (5/325)) 1 tab Q6H  PRN PO .PAIN 4-6;  


Start 4/18/19 at 16:30


Acetaminophen/ Hydrocodone Bitart (Norco (5/325)) 2 tab Q6H  PRN PO .PAIN 7-10; 


Start 4/18/19 at 16:30


Morphine Sulfate (morphine) 2 mg Q4H  PRN IV .PAIN 7-10;  Start 4/18/19 at 16:30


Heparin Sodium (Porcine) (Heparin  (5000 Units/1ml)) 5,000 unit Q12 SC  Last 


administered on 4/20/19at 09:15; Admin Dose 5,000 UNIT;  Start 4/18/19 at 21:00


Vancomycin HCl (Vanco Iv Per Pharmacy) 1 ea PER PROTOCOL XX ;  Start 4/18/19 at 


16:30


Piperacillin Sod/ Tazobactam Sod 100 ml @  200 mls/hr Q8 IVPB  Last administered


on 4/20/19at 06:40; Admin Dose 200 MLS/HR;  Start 4/18/19 at 22:00


Diphenhydramine HCl (Benadryl) 25 mg Q6H  PRN IV itching Last administered on 


4/18/19at 20:09; Admin Dose 25 MG;  Start 4/18/19 at 17:00


Dicyclomine HCl (Bentyl) 10 mg QID  PRN PO abdominal cramps Last administered on


4/18/19at 17:02; Admin Dose 10 MG;  Start 4/18/19 at 17:00


Clonidine (Catapres) 0.1 mg Q4H  PRN PO SBP >170;  Start 4/18/19 at 17:00


Lorazepam (Ativan) 1 mg Q4  PRN IV agitation, withdrawal Last administered on 


4/19/19at 14:52; Admin Dose 1 MG;  Start 4/18/19 at 17:00


Chlordiazepoxide (Librium) 25 mg TID PO  Last administered on 4/20/19at 08:58; 


Admin Dose 25 MG;  Start 4/18/19 at 21:00


Docusate Sodium (Colace) 100 mg Q12H PO  Last administered on 4/20/19at 05:24; 


Admin Dose 100 MG;  Start 4/19/19 at 04:30


Magnesium Hydroxide (Milk Of Mag) 30 ml DAILY PO  Last administered on 4/20/19at


08:56; Admin Dose 30 ML;  Start 4/19/19 at 09:00


Al Hydrox/Mg Hydrox/Simethicone (Mag-Al Plus) 30 ml Q6H  PRN PO GASTROINTESTINAL


UPSET Last administered on 4/19/19at 04:29; Admin Dose 30 ML;  Start 4/18/19 at 


22:00


Nicotine (Nicoderm 21 Mg/ 24hr) 1 patch DAILY TRANSDERM  Last administered on 


4/20/19at 08:56; Admin Dose 1 PATCH;  Start 4/19/19 at 09:30


Nicotine Polacrilex (Nicorette) 2 mg Q2H  PRN BUCCAL craving Last administered 


on 4/19/19at 12:12; Admin Dose 2 MG;  Start 4/19/19 at 09:30


Vancomycin HCl 250 ml @  125 mls/hr Q12H IVPB  Last administered on 4/20/19at 


08:56; Admin Dose 125 MLS/HR;  Start 4/19/19 at 20:00


Pantoprazole (Protonix Tab) 40 mg BID@0600,1800 PO  Last administered on 


4/20/19at 05:24; Admin Dose 40 MG;  Start 4/19/19 at 18:00


Calcium Carbonate (Tums) 500 mg Q4  PRN PO reflux Last administered on 4/19/19at


14:52; Admin Dose 500 MG;  Start 4/19/19 at 14:00


Baclofen (Lioresal) 10 mg TID  PRN PO hiccpus Last administered on 4/19/19at 


14:52; Admin Dose 10 MG;  Start 4/19/19 at 14:30











AKBAR GARSIA NP            Apr 20, 2019 13:05

## 2019-04-20 NOTE — PDOCDIS
Discharge Instructions


DIAGNOSIS


Discharge Diagnosis


1. Septic shock, resolved


2.  Acute renal failure- resolved


3.  Hypovolemic hyponatremia- resolved


4.  Generalized rash- resolved


5.  hx substance abuse


6.  tobacco use


7.  ETOH use


8. GERD


9.  Hiccups





CONDITION


                [id number]
Patient Condition:  [id number]
Stable








FOLLOW UP/APPOINTMENTS


Follow-up Plan


1. Follow up with your primary care physician in 1-2 weeks


2. Take Pantoprazole twice a day for reflux symptoms.  If you are still 


experiencing acid reflux, take Tums which is sold over the counter for relief


3. Take Bactrim twice a day for 7 days with next dose tomorrow morning


4.  If experiencing any concerning symptoms, go to your nearest emergency 


department


5.  It is recommended you refrain for substance use, smoking and alcohol use if 


possible











ANDREAS THORPE MD                 Apr 20, 2019 12:42

## 2019-04-20 NOTE — DS
Date/Time of Note


Date/Time of Note


DATE: 4/20/19 


TIME: 15:37





Discharge Summary


Admission/Discharge Info


Admit Date/Time


Apr 18, 2019 at 16:22


Discharge Date/Time


Apr 20, 2019 at 14:35


Discharge Diagnosis


1. Septic shock, resolved


2.  Acute renal failure- resolved


3.  Hypovolemic hyponatremia- resolved


4.  Generalized rash- resolved


5.  hx substance abuse


6.  tobacco use


7.  ETOH use


8. GERD


9.  Hiccups


Patient Condition:  Stable


Consults


Infectious disease- Dr Dreyer


Procedures


PROCEDURE:   XR Chest AP portable 


 


CLINICAL INDICATION:   Sepsis 


 


TECHNIQUE:   An AP portable radiograph of the chest was submitted. 


 


COMPARISON:   None. 


 


FINDINGS:


 


Support Hardware: None


 


Cardiovascular: The cardiovascular silhouette appears unremarkable.


 


Lung Fields: The lung fields appear clear with no nodule, alveolar infiltrate, 


or interstitial prominence evident.


 


Pleural Spaces: No pneumothorax or pleural effusion is identified.


 


Osseous Structures: There is a mild apparent levoscoliotic curve to the thoracic


spine.


 


Soft Tissues: The soft tissues appear unremarkable.


 


IMPRESSION: Unremarkable portable chest without evidence of active 


cardiopulmonary disease.


_____________________________________________ 


Physician Kai           Date    Time 


Electronically viewed and signed by ADRIEL Hinton Physician on 04/18/2019 15:19


Hx of Present Illness


54 yo M with PMH alcohol, tobacco, and IVDU (meth) presented to ED with 


complaints of shortness of breath, itching, and generalized rash since last 


night.  Patient states at dinner last night he started to feel unwell but 


decided not to go to the ED.  This am he was experiencing worsening symptoms 


with new onset SOB and was brought to the ED.  Patient admits to IVDU 2 days ago


and was seen previously last week in the ED due to a draining forearm abscess.  


Patient was discharged from ED with PO antibiotics but states he lost half the 


bottle.  Patient denies any new foods, lotions, detergents, or contact with 


anything that may have caused his rash.  He does admit to feeling as if he is 


withdrawing from meth at this time.  Patient is complaining of feeling cold.  He


also admits to abdominal discomfort, constipation, and experiencing nausea due 


to acid reflux.  Denies any fevers, chest pain, dizziness, urinary issues, 


palpitations, or paraesthesia of limbs.  Admits to smoking 1ppd and 1 alcoholic 


beverage daily.


Hospital Course


Patient was admitted for treatment of septic shock and workup was performed to 


determine source.  Infectious disease was consulted for antibiotic 


recommendations.  Patient was provided supportive treatment given was also found


to be withdrawing from methamphetamine use.  He was started on IVF and IV 


antibiotics.  Over course of hospitalization, patients WBC normalized as well as


lactic acidosis resolved.  His renal function returned to baseline and vitals 


remained stable.  Patients blood, urine, and stool cultures were all negative 


for acute bacteremia.  Given multiple scabs at injection sites, he was 


transitioned to PO antibiotics although wounds did not appear infected.  


Patients presenting symptoms improved significantly and on day of discharge, 


patients vitals remained stable.  He was cleared from Infectious disease for 


discharge home.  Patient was discharged home in good condition.


Home Meds


Active Scripts


Sulfamethoxazole/Trimethoprim* (Bactrim Ds* Tablet) 1 Each Tablet, 1 TAB PO BID 


for 7 Days, #14 TAB


   Prov:ANDREAS THORPE MD         4/20/19


Pantoprazole* (Pantoprazole*) 40 Mg Tablet.dr, 40 MG PO BID@0600,1800 for 30 


Days, #60 TAB


   Prov:ANDREAS THORPE MD         4/20/19


Discontinued Scripts


Sulfamethoxazole/Trimethoprim* (Bactrim Ds* Tablet) 1 Each Tablet, 1 TAB PO BID 


for 10 Days, TAB


   Prov:TOBY PRICE PA-C         4/11/19


Cephalexin* (Keflex*) 500 Mg Capsule, 500 MG PO QID for 10 Days, CAP


   Prov:TOBY PRICE PA-C         4/11/19


Follow-up Plan


1. Follow up with your primary care physician in 1-2 weeks


2. Take Pantoprazole twice a day for reflux symptoms.  If you are still 


experiencing acid reflux, take Tums which is sold over the counter for relief


3. Take Bactrim twice a day for 7 days with next dose tomorrow morning


4.  If experiencing any concerning symptoms, go to your nearest emergency 


department


5.  It is recommended you refrain for substance use, smoking and alcohol use if 


possible


Primary Care Provider


Care Physician No Primary


Time spent on discharge:   > 30 minutes


Pending Labs





Laboratory Tests


         Test
                                            4/20/19
11:32


         White Blood Count
                      9.7 10^3/ul
(4.8-10.8)


         Red Blood Count
                      3.80 10^6/ul
(4.70-6.10)


         Hemoglobin
                              11.5 g/dl
(14.0-18.0)


         Hematocrit
                                 35.5 %
(42.0-52.0)


         Mean Corpuscular Volume
                  93.4 fl
(82.0-101.0)


         Mean Corpuscular Hemoglobin
               30.3 pg
(29.0-33.0)


         Mean Corpuscular Hemoglobin
Concent      32.4 g/dl
(32.0-37.0)


         Red Cell Distribution Width
                12.9 %
(11.5-14.5)


         Platelet Count
                          235 10^3/UL
(140-415)


         Mean Platelet Volume
                       10.1 fl
(7.4-10.4)


         Immature Granulocytes %
                 0.300 %
(0.001-0.429)


         Neutrophils %
                              60.7 %
(39.0-77.0)


         Lymphocytes %
                              32.6 %
(15.0-51.0)


         Monocytes %
                                  5.3 %
(0.0-11.0)


         Eosinophils %
                                 0.9 %
(0.0-7.0)


         Basophils %
                                   0.2 %
(0.0-2.0)


         Nucleated Red Blood Cells %
             0.0 /100WBC
(0.0-0.0)


         Immature Granulocytes #
             0.030 10^3/ul
(0.0-0.031)


         Neutrophils #
                           5.9 10^3/ul
(1.6-7.5)


         Lymphocytes #
                           3.2 10^3/ul
(0.8-2.9)


         Monocytes #
                             0.5 10^3/ul
(0.3-0.9)


         Eosinophils #
                           0.1 10^3/ul
(0.0-0.5)


         Basophils #
                             0.0 10^3/ul
(0.0-0.1)


         Nucleated Red Blood Cells #
             0.0 10^3/ul
(0.0-0.0)


         Sodium Level
                             136 mmol/L
(135-144)


         Potassium Level
                          3.8 mmol/L
(3.5-5.1)


         Chloride Level
                            102 mmol/L
()


         Carbon Dioxide Level
                        26 mmol/L
(21-31)


         Anion Gap                                            8 (5-13)


         Blood Urea Nitrogen
                           10 mg/dl
(7-20)


         Creatinine
                             0.87 mg/dl
(0.61-1.24)


         Est Glomerular Filtrat Rate
mL/min   > 60 mL/min
(>60)


         Glucose Level
                              112 mg/dl
()


         Calcium Level
                            8.3 mg/dl
(8.4-10.2)


         Magnesium Level
                           2.4 mg/dl
(1.7-2.5)


         Total Bilirubin
                           0.2 mg/dl
(0.2-1.3)


         Direct Bilirubin
                       0.00 mg/dl
(0.00-0.20)


         Indirect Bilirubin
                          0.2 mg/dl
(0-1.1)


         Aspartate Amino Transf
(AST/SGOT)              24 IU/L
(15-46)


         Alanine Aminotransferase
(ALT/SGPT)            24 IU/L
(13-69)


         Alkaline Phosphatase
                         84 IU/L
()


         Total Protein
                              5.9 g/dl
(6.1-8.1)


         Albumin
                                    3.1 g/dl
(3.3-4.9)


         Globulin
                                  2.80 g/dl
(1.3-3.2)


         Albumin/Globulin Ratio                                   1.10














ANDREAS THORPE MD                 Apr 20, 2019 15:41

## 2019-04-22 NOTE — RADRPT
Echocardiogram Report

 

Patient Name: Patrice MURGUIAtient ID: [id number]

: 1965 (54y )Study Date: 2019 11:08:52 AM

Gender: MAccession #: [id number]

Tech: RAJENDRA Olivarez [facility]                Location: [facility]          

Ref.Physician: ANDREAS THORPE            Height(Cm):            

 

BSA: Weight(Kg):

Quality: AdequateAccount #:

 

 

Procedures:

 

Echocardiographic Report:

Transthoracic echocardiogram with complete 2D, M-Mode, and doppler 

examination.

 

Indications:

 

Endocarditis, and Shortness of breath.

 

 

Measurements:

2D/M Mode                                   Doppler                                               

Measurement    Value    Normal Range        Measurement      Value    Normal Range                

LVIDd 2D       4.9      [ 4.2 - 5.8 ] cm    AV Peak Nicholas      1.7      [ 100.0 - 170.0 ] cm/sec    

LVIDs 2D       3.0      [ 2.5 - 4.0 ] cm    AV Peak PG       11.0     [ 2.0 - 9.0 ] mmHg          

LVPWd 2D       1.1      [ 0.6 - 1.0 ] cm    LVOT Peak Nicholas    1.1      [ 70.0 - 110.0 ] cm/sec     

IVSd 2D        1.1      [ 0.6 - 1.0 ] cm    LVOT Peak PG     5.0      [ 2.0 - 6.0 ] mmHg          

IVS/LVPW 2D    1.0      ratio               MV E Peak Nicholas    0.8      [ 60.0 - 130.0 ] cm/sec     

AoR Diam 2D    2.8      [ 2.6 - 3.4 ] cm    MV A Peak Nicholas    0.6      [ 100.0 - 120.0 ] cm/sec    

LA/Ao 2D       1        ratio               MV E/A           1.3      [ 0.8 - 1.5 ] ratio         

LA Dimen 2D    3.1      [ 3.0 - 4.0 ] cm    MV Decel Time    169      [ 104 - 258 ] msec          

                                            Lat E` Nicholas       0.1      [ 10.0 - 15.0 ] cm/sec      

                                            MV E/A           1.3      [ 0.8 - 1.5 ] ratio         

                                            TR Peak Nicholas      2.8      [ 100.0 - 280.0 ] cm/sec    

                                            TR Peak PG       31.0     mmHg                        

                                            RVSP             34.0     [ 10.0 - 36.0 ] mmHg        

                                            RA Pressure      3.0      mmHg                        

 

Findings:

 

Left Ventricle:

Normal left ventricular systolic function. Normal left ventricular 

cavity size. Mild concentric left ventricular hypertrophy. Ejection 

fraction is visually estimated at 60 %.

 

Right Ventricle:

Normal right ventricular size. Normal right ventricular systolic 

function.

 

Left Atrium:

The left atrium is normal in size.

 

Right Atrium:

The right atrium is normal in size.

 

Mitral Valve:

Normal appearance and function of the mitral valve with trace 

physiologic regurgitation.

 

Aortic Valve:

No significant aortic stenosis or insufficiency. Aortic cusps appear 

mildly calcified.

 

Tricuspid Valve:

Normal appearance and function of the tricuspid valve with trace 

physiologic regurgitation. Normal right ventricular systolic pressure.

 

Pulmonic Valve:

Normal pulmonic valve appearance.

 

Pericardium:

Normal pericardium with no significant pericardial effusion.

 

Aorta:

Normal aortic root.

 

IVC:

Normal size and normal respiratory collapse consistent with normal right 

atrial pressure.

 

 

Conclusions:

 

Normal left ventricular systolic function. Normal left ventricular 

cavity size. Mild concentric left ventricular hypertrophy. Ejection 

fraction is visually estimated at 60 %.

 

Normal right ventricular size. Normal right ventricular systolic 

function.

 

The left atrium is normal in size.

 

The right atrium is normal in size.

 

No significant valvular stenosis or regurgitation seen.

 

Normal pericardium with no significant pericardial effusion.

 

Electronically Signed By:

 

Sam Alva

2019 14:27:23 PDT